# Patient Record
Sex: MALE | Race: WHITE | Employment: FULL TIME | URBAN - NONMETROPOLITAN AREA
[De-identification: names, ages, dates, MRNs, and addresses within clinical notes are randomized per-mention and may not be internally consistent; named-entity substitution may affect disease eponyms.]

---

## 2017-06-13 ENCOUNTER — OFFICE VISIT (OUTPATIENT)
Dept: FAMILY MEDICINE | Facility: OTHER | Age: 37
End: 2017-06-13
Attending: FAMILY MEDICINE
Payer: COMMERCIAL

## 2017-06-13 VITALS
TEMPERATURE: 97.7 F | RESPIRATION RATE: 16 BRPM | SYSTOLIC BLOOD PRESSURE: 118 MMHG | DIASTOLIC BLOOD PRESSURE: 78 MMHG | HEIGHT: 70 IN | HEART RATE: 74 BPM | WEIGHT: 303 LBS | OXYGEN SATURATION: 96 % | BODY MASS INDEX: 43.38 KG/M2

## 2017-06-13 DIAGNOSIS — Z71.89 ACP (ADVANCE CARE PLANNING): ICD-10-CM

## 2017-06-13 DIAGNOSIS — R53.83 OTHER FATIGUE: ICD-10-CM

## 2017-06-13 DIAGNOSIS — K22.70 BARRETT'S ESOPHAGUS WITHOUT DYSPLASIA: ICD-10-CM

## 2017-06-13 DIAGNOSIS — Z00.00 ROUTINE HISTORY AND PHYSICAL EXAMINATION OF ADULT: Primary | ICD-10-CM

## 2017-06-13 DIAGNOSIS — L23.7 CONTACT DERMATITIS DUE TO POISON IVY: ICD-10-CM

## 2017-06-13 DIAGNOSIS — K21.00 GASTROESOPHAGEAL REFLUX DISEASE WITH ESOPHAGITIS: ICD-10-CM

## 2017-06-13 PROCEDURE — 99395 PREV VISIT EST AGE 18-39: CPT | Performed by: FAMILY MEDICINE

## 2017-06-13 RX ORDER — TRIAMCINOLONE ACETONIDE 5 MG/G
CREAM TOPICAL
Qty: 30 G | Refills: 0 | Status: SHIPPED | OUTPATIENT
Start: 2017-06-13 | End: 2018-08-06

## 2017-06-13 ASSESSMENT — PAIN SCALES - GENERAL: PAINLEVEL: NO PAIN (0)

## 2017-06-13 NOTE — MR AVS SNAPSHOT
After Visit Summary   6/13/2017    Tristan Melgar    MRN: [id number]           Patient Information     Date Of Birth          1980        Visit Information        Provider Department      6/13/2017 3:00 PM Stone Cook MD [facility]        Today's Diagnoses     Routine history and physical examination of adult    -  1    ACP (advance care planning)        Reina's esophagus without dysplasia        Gastroesophageal reflux disease with esophagitis        Contact dermatitis due to poison ivy        Other fatigue          Care Instructions    F/u with ongoing concerns.           Follow-ups after your visit        Additional Services     SLEEP EVALUATION & MANAGEMENT REFERRAL - ADULT       Please be aware that coverage of these services is subject to the terms and limitations of your health insurance plan.  Call member services at your health plan with any benefit or coverage questions.      Please bring the following to your appointment:    >>   List of current medications   >>   This referral request   >>   Any documents/labs given to you for this referral    Other:  none                  Your next 10 appointments already scheduled     Jul 13, 2017  4:00 PM CDT   (Arrive by 3:45 PM)   SHORT with Stone Cook MD   [facility] ([facility] )    [address] [facility] [address]   [phone]              Future tests that were ordered for you today     Open Future Orders        Priority Expected Expires Ordered    SLEEP EVALUATION & MANAGEMENT REFERRAL - ADULT Routine  6/13/2018 6/13/2017    CBC with platelets and differential Routine  9/13/2017 6/13/2017    Lipid Profile (Chol, Trig, HDL, LDL calc) Routine  9/13/2017 6/13/2017    Basic metabolic panel Routine  9/13/2017 6/13/2017            Who to contact     If you have questions or need follow up information about today's clinic visit or your schedule please contact [place]  [facility] directly at [phone].  Normal or non-critical lab and imaging results will be communicated to you by MyChart, letter or phone within 4 business days after the clinic has received the results. If you do not hear from us within 7 days, please contact the clinic through [employer] or phone. If you have a critical or abnormal lab result, we will notify you by phone as soon as possible.  Submit refill requests through [employer] or call your pharmacy and they will forward the refill request to us. Please allow 3 business days for your refill to be completed.          Additional Information About Your Visit        [employer]Vide[employer]ious Information     [email or web address] lets you send messages to your doctor, view your test results, renew your prescriptions, schedule appointments and more. To sign up, go to [email or web address] . Click on \"Log in\" on the left side of the screen, which will take you to the Welcome page. Then click on \"Sign up Now\" on the right side of the page.     You will be asked to enter the access code listed below, as well as some personal information. Please follow the directions to create your username and password.     Your access code is: [email or web address]  Expires: 2017  5:42 PM     Your access code will  in 90 days. If you need help or a new code, please call your [place] clinic or [phone].        Care EveryWhere ID     This is your Care EveryWhere ID. This could be used by other organizations to access your [place] medical records  [id number]        Your Vitals Were     Pulse Temperature Respirations Height Pulse Oximetry BMI (Body Mass Index)    74 97.7  F (36.5  C) (Tympanic) 16 5' 10\" (1.778 m) 96% 43.48 kg/m2       Blood Pressure from Last 3 Encounters:   17 118/78   07/07/15 122/72   06/26/15 106/67    Weight from Last 3 Encounters:   17 (!) 303 lb (137.4 kg)   06/25/15 (!) 310 lb (140.6 kg)   06/23/15 (!) 310 lb (140.6 kg)                 Today's Medication Changes    "       These changes are accurate as of: 6/13/17  5:42 PM.  If you have any questions, ask your nurse or doctor.               Start taking these medicines.        Dose/Directions    triamcinolone 0.5 % cream   Commonly known as:  KENALOG   Used for:  Routine history and physical examination of adult   Started by:  Stone Cook MD        Apply sparingly to affected area three times daily.   Quantity:  30 g   Refills:  0            Where to get your medicines      These medications were sent to [id number] [place] [address]   [address] , [place] [address]     Phone:  [phone]     triamcinolone 0.5 % cream                Primary Care Provider Office Phone # Fax #    Stone Cook [phone]       [facility] [address] [facility] [address]        Thank you!     Thank you for choosing [facility]  for your care. Our goal is always to provide you with excellent care. Hearing back from our patients is one way we can continue to improve our services. Please take a few minutes to complete the written survey that you may receive in the mail after your visit with us. Thank you!             Your Updated Medication List - Protect others around you: Learn how to safely use, store and throw away your medicines at www.disposemymeds.org.          This list is accurate as of: 6/13/17  5:42 PM.  Always use your most recent med list.                   Brand Name Dispense Instructions for use    ALLEGRA ALLERGY PO      Take 180 mg by mouth daily       BENADRYL 25 MG tablet   Generic drug:  diphenhydrAMINE      Take 50 mg by mouth every 6 hours as needed for itching or allergies       EPINEPHrine 0.3 MG/0.3ML injection    EPIPEN 2-KENZIE    1 each    Inject 0.3 mLs (0.3 mg) into the muscle once as needed for anaphylaxis       IMODIUM A-D PO      Take by mouth as needed       omeprazole 40 MG capsule    priLOSEC    90 capsule    Take 1 capsule (40 mg) by  mouth daily Take 30-60 minutes before a meal.       triamcinolone 0.5 % cream    KENALOG    30 g    Apply sparingly to affected area three times daily.

## 2017-06-13 NOTE — PROGRESS NOTES
Subjective:  Tristan Melgar is a 36 year old male who presents for routine cares.    Past Medical History:   Diagnosis Date     Fracture     L1       Past Surgical History:   Procedure Laterality Date     ESOPHAGOSCOPY, GASTROSCOPY, DUODENOSCOPY (EGD), COMBINED N/A 6/26/2015    Procedure: COMBINED ESOPHAGOSCOPY, GASTROSCOPY, DUODENOSCOPY (EGD);  Surgeon: Jasper Lima DO;  Location: [facility]     NO HISTORY OF SURGERY       ORTHOPEDIC SURGERY Left 2011    meniscus, knee       Family History   Problem Relation Age of Onset     Other Cancer Maternal Grandmother      throat cancer     Other Cancer Maternal Grandfather      Other Cancer Paternal Grandmother      pancreatic cancer     Other Cancer Paternal Grandfather      tumor on the spine       Social History   Substance Use Topics     Smoking status: Never Smoker     Smokeless tobacco: Never Used     Alcohol use No       Current Outpatient Prescriptions   Medication     triamcinolone (KENALOG) 0.5 % cream     Fexofenadine HCl (ALLEGRA ALLERGY PO)     omeprazole (PRILOSEC) 40 MG capsule     Loperamide HCl (IMODIUM A-D PO)     diphenhydrAMINE (BENADRYL) 25 MG tablet     EPINEPHrine (EPIPEN 2-KENZIE) 0.3 MG/0.3ML injection     No current facility-administered medications for this visit.        No Known Allergies    Review of Systems:  Gen: negative for fever, chills, change in weight  Derm: negative for worrisome rashes, moles or lesions  Eyes: negative for vision changes or irritation  ENT: negative for ear, mouth and throat problems  Resp: negative for significant cough or SOB  Breast: negative for masses, tenderness or discharge  CV: negative for chest pain, palpitations or peripheral edema  GI: negative for nausea, abdominal pain, heartburn, or change in bowel habits  : negative for frequency, dysuria, or hematuria  Musculoskeletal: negative for significant arthralgias or myalgia  Neuro: negative for weakness, dizziness or paresthesias  Endo: negative for  temperature intolerance, skin/hair changes  Heme: negative for bleeding problems  Psych: negative for changes in mood or affect    Objective:  B/P: 118/78, T: 97.7, P: 74, R: 16    Physical Exam:  Constitutional: healthy, alert and no distress  Head: Normocephalic. No masses, lesions, tenderness or abnormalities  ENT: ENT exam normal, no neck nodes or sinus tenderness  CV: RRR. No murmurs, clicks gallops or rub  Pulm: Lungs clear to auscultation b/l, no rhonchi, rales or wheezes.  GI: Abdomen soft, non-tender. BS normal. No masses, organomegaly  : No abnormalities noted  Musculoskeletal: extremities normal- no gross deformities noted, gait normal and normal muscle tone  Skin: no suspicious lesions or rashes.  Large tag on left lower buttocks.  Contact dermatitis on the right thigh and on the forearms.    Neuroc: Gait normal. Reflexes normal and symmetric. Sensation grossly WNL.  Psych: mentation appears normal and affect normal/bright  Heme: normal ant/post cervical, axillary, supraclavicular and inguinal nodes    Labs pending, see chart for results.    Assessment and Plan:  (Z00.00) Routine history and physical examination of adult  (primary encounter diagnosis)  Comment: doing well   Plan: triamcinolone (KENALOG) 0.5 % cream, CBC with         platelets and differential, Lipid Profile         (Chol, Trig, HDL, LDL calc), Basic metabolic         panel        Update labs and follow.       (K22.70) Reina's esophagus without dysplasia  Comment: discussed.   Plan: due for scope next year.     (K21.0) Gastroesophageal reflux disease with esophagitis  Comment: stable.   Plan: continue prilosec.     (L23.7) Contact dermatitis due to poison ivy  Comment: reviewed.   Plan: tcm cream.     (R53.83) Other fatigue  Comment: discussed.   Plan: CBC with platelets and differential, Basic         metabolic panel, SLEEP EVALUATION & MANAGEMENT         REFERRAL - ADULT        Loud snoring.  Getting sleep study.      Visit to set  up skin tag removal.    .      Stone Cook

## 2017-06-13 NOTE — NURSING NOTE
"Chief Complaint   Patient presents with     Physical     Pt is in for a physical today.     Gastrophageal Reflux     Pt is in for a FU on GERD.     Derm Problem     Pt has a rash on his right forearm, left forearm and abd for one week. Pt has not been in the woods.       Initial /78 (BP Location: Right arm, Patient Position: Chair, Cuff Size: Adult Large)  Pulse 74  Temp 97.7  F (36.5  C) (Tympanic)  Resp 16  Ht 5' 10\" (1.778 m)  Wt (!) 303 lb (137.4 kg)  SpO2 96%  BMI 43.48 kg/m2 Estimated body mass index is 43.48 kg/(m^2) as calculated from the following:    Height as of this encounter: 5' 10\" (1.778 m).    Weight as of this encounter: 303 lb (137.4 kg).  Medication Reconciliation: complete   Parris Maier    "

## 2017-06-14 ENCOUNTER — TRANSFERRED RECORDS (OUTPATIENT)
Dept: HEALTH INFORMATION MANAGEMENT | Facility: HOSPITAL | Age: 37
End: 2017-06-14

## 2017-06-14 ASSESSMENT — PATIENT HEALTH QUESTIONNAIRE - PHQ9: SUM OF ALL RESPONSES TO PHQ QUESTIONS 1-9: 2

## 2017-06-16 DIAGNOSIS — R73.9 HYPERGLYCEMIA: Primary | ICD-10-CM

## 2017-06-16 DIAGNOSIS — Z00.00 ROUTINE HISTORY AND PHYSICAL EXAMINATION OF ADULT: ICD-10-CM

## 2017-06-16 DIAGNOSIS — R53.83 OTHER FATIGUE: ICD-10-CM

## 2017-06-16 LAB
ANION GAP SERPL CALCULATED.3IONS-SCNC: 10 MMOL/L (ref 3–14)
BASOPHILS # BLD AUTO: 0 10E9/L (ref 0–0.2)
BASOPHILS NFR BLD AUTO: 0.4 %
BUN SERPL-MCNC: 12 MG/DL (ref 7–30)
CALCIUM SERPL-MCNC: 8.4 MG/DL (ref 8.5–10.1)
CHLORIDE SERPL-SCNC: 104 MMOL/L (ref 94–109)
CHOLEST SERPL-MCNC: 184 MG/DL
CO2 SERPL-SCNC: 25 MMOL/L (ref 20–32)
CREAT SERPL-MCNC: 0.9 MG/DL (ref 0.66–1.25)
DIFFERENTIAL METHOD BLD: NORMAL
EOSINOPHIL # BLD AUTO: 0.2 10E9/L (ref 0–0.7)
EOSINOPHIL NFR BLD AUTO: 3.3 %
ERYTHROCYTE [DISTWIDTH] IN BLOOD BY AUTOMATED COUNT: 13.3 % (ref 10–15)
GFR SERPL CREATININE-BSD FRML MDRD: ABNORMAL ML/MIN/1.7M2
GLUCOSE SERPL-MCNC: 168 MG/DL (ref 70–99)
HCT VFR BLD AUTO: 43.2 % (ref 40–53)
HDLC SERPL-MCNC: 48 MG/DL
HGB BLD-MCNC: 14.7 G/DL (ref 13.3–17.7)
LDLC SERPL CALC-MCNC: 97 MG/DL
LYMPHOCYTES # BLD AUTO: 1.3 10E9/L (ref 0.8–5.3)
LYMPHOCYTES NFR BLD AUTO: 19.2 %
MCH RBC QN AUTO: 31.3 PG (ref 26.5–33)
MCHC RBC AUTO-ENTMCNC: 34 G/DL (ref 31.5–36.5)
MCV RBC AUTO: 92 FL (ref 78–100)
MONOCYTES # BLD AUTO: 0.4 10E9/L (ref 0–1.3)
MONOCYTES NFR BLD AUTO: 5.2 %
NEUTROPHILS # BLD AUTO: 4.9 10E9/L (ref 1.6–8.3)
NEUTROPHILS NFR BLD AUTO: 71.9 %
NONHDLC SERPL-MCNC: 136 MG/DL
PLATELET # BLD AUTO: 243 10E9/L (ref 150–450)
POTASSIUM SERPL-SCNC: 3.8 MMOL/L (ref 3.4–5.3)
RBC # BLD AUTO: 4.7 10E12/L (ref 4.4–5.9)
SODIUM SERPL-SCNC: 139 MMOL/L (ref 133–144)
TRIGL SERPL-MCNC: 195 MG/DL
WBC # BLD AUTO: 6.9 10E9/L (ref 4–11)

## 2017-06-16 PROCEDURE — 85025 COMPLETE CBC W/AUTO DIFF WBC: CPT | Performed by: FAMILY MEDICINE

## 2017-06-16 PROCEDURE — 36415 COLL VENOUS BLD VENIPUNCTURE: CPT | Performed by: FAMILY MEDICINE

## 2017-06-16 PROCEDURE — 80061 LIPID PANEL: CPT | Performed by: FAMILY MEDICINE

## 2017-06-16 PROCEDURE — 80048 BASIC METABOLIC PNL TOTAL CA: CPT | Performed by: FAMILY MEDICINE

## 2017-06-20 ENCOUNTER — OFFICE VISIT (OUTPATIENT)
Dept: SLEEP MEDICINE | Facility: HOSPITAL | Age: 37
End: 2017-06-20
Attending: INTERNAL MEDICINE
Payer: COMMERCIAL

## 2017-06-20 ENCOUNTER — OFFICE VISIT (OUTPATIENT)
Dept: SLEEP MEDICINE | Facility: HOSPITAL | Age: 37
End: 2017-06-20
Attending: FAMILY MEDICINE
Payer: COMMERCIAL

## 2017-06-20 VITALS
OXYGEN SATURATION: 96 % | BODY MASS INDEX: 42 KG/M2 | SYSTOLIC BLOOD PRESSURE: 120 MMHG | WEIGHT: 300 LBS | RESPIRATION RATE: 14 BRPM | HEART RATE: 70 BPM | HEIGHT: 71 IN | DIASTOLIC BLOOD PRESSURE: 64 MMHG

## 2017-06-20 DIAGNOSIS — G47.33 OBSTRUCTIVE SLEEP APNEA SYNDROME: Primary | ICD-10-CM

## 2017-06-20 DIAGNOSIS — R53.83 OTHER FATIGUE: ICD-10-CM

## 2017-06-20 PROCEDURE — G0399 HOME SLEEP TEST/TYPE 3 PORTA: HCPCS

## 2017-06-20 PROCEDURE — G0399 HOME SLEEP TEST/TYPE 3 PORTA: HCPCS | Mod: 26 | Performed by: INTERNAL MEDICINE

## 2017-06-20 PROCEDURE — 99212 OFFICE O/P EST SF 10 MIN: CPT

## 2017-06-20 PROCEDURE — 99241 ZZC OFFICE CONSULTATION,LEVEL I: CPT | Performed by: INTERNAL MEDICINE

## 2017-06-20 NOTE — MR AVS SNAPSHOT
"              After Visit Summary   6/20/2017    Tristan Melgar    MRN: [id number]           Patient Information     Date Of Birth          1980        Visit Information        Provider Department      6/20/2017 2:30 PM [facility]        Today's Diagnoses     Obstructive sleep apnea syndrome    -  1       Follow-ups after your visit        Your next 10 appointments already scheduled     Jul 13, 2017  4:00 PM CDT   (Arrive by 3:45 PM)   SHORT with Stone Cook MD   [facility] )    [address]  [facility] [address]   [phone]              Future tests that were ordered for you today     Open Future Orders        Priority Expected Expires Ordered    HST-Home Sleep Apnea Test Routine  12/20/2017 6/20/2017            Who to contact     If you have questions or need follow up information about today's clinic visit or your schedule please contact [facility] directly at [phone].  Normal or non-critical lab and imaging results will be communicated to you by MyChart, letter or phone within 4 business days after the clinic has received the results. If you do not hear from us within 7 days, please contact the clinic through [employer] or phone. If you have a critical or abnormal lab result, we will notify you by phone as soon as possible.  Submit refill requests through [employer] or call your pharmacy and they will forward the refill request to us. Please allow 3 business days for your refill to be completed.          Additional Information About Your Visit        [employer] Information     [employer] lets you send messages to your doctor, view your test results, renew your prescriptions, schedule appointments and more. To sign up, go to [email or web address] . Click on \"Log in\" on the left side of the screen, which will take you to the Welcome page. Then click on \"Sign up Now\" on the right side of the page.     You will be asked to enter the " access code listed below, as well as some personal information. Please follow the directions to create your username and password.     Your access code is: [email or web address]  Expires: 2017  5:42 PM     Your access code will  in 90 days. If you need help or a new code, please call your [facility] or [phone].        Care EveryWhere ID     This is your Care EveryWhere ID. This could be used by other organizations to access your [place] medical records  [id number]         Blood Pressure from Last 3 Encounters:   17 120/64   17 118/78   07/07/15 122/72    Weight from Last 3 Encounters:   17 300 lb (136.1 kg)   17 (!) 303 lb (137.4 kg)   06/25/15 (!) 310 lb (140.6 kg)              We Performed the Following     Comprehensive DME        Primary Care Provider Office Phone # Fax #    Stone Cook [phone]       [address] [address]        Equal Access to Services     [facility]: Hadii aad ku hadasho Soomaali, waaxda luqadaha, qaybta kaalmada adelilian, britton enriquez . So [facility] [phone].    ATENCIÓN: Si habla español, tiene a correa disposición servicios gratuitos de asistencia lingüística. [facility] [phone].    We comply with applicable federal civil rights laws and [place] laws. We do not discriminate on the basis of race, color, national origin, age, disability sex, sexual orientation or gender identity.            Thank you!     Thank you for choosing [facility]  for your care. Our goal is always to provide you with excellent care. Hearing back from our patients is one way we can continue to improve our services. Please take a few minutes to complete the written survey that you may receive in the mail after your visit with us. Thank you!             Your Updated Medication List - Protect others around you: Learn how to safely use, store and throw away your medicines at  www.disposemymeds.org.          This list is accurate as of: 6/20/17 11:59 PM.  Always use your most recent med list.                   Brand Name Dispense Instructions for use Diagnosis    ALLEGRA ALLERGY PO      Take 180 mg by mouth daily        BENADRYL 25 MG tablet   Generic drug:  diphenhydrAMINE      Take 50 mg by mouth every 6 hours as needed for itching or allergies        EPINEPHrine 0.3 MG/0.3ML injection    EPIPEN 2-KENZIE    1 each    Inject 0.3 mLs (0.3 mg) into the muscle once as needed for anaphylaxis    Allergic reaction, initial encounter       IMODIUM A-D PO      Take by mouth as needed        omeprazole 40 MG capsule    priLOSEC    90 capsule    Take 1 capsule (40 mg) by mouth daily Take 30-60 minutes before a meal.    Esophageal reflux       triamcinolone 0.5 % cream    KENALOG    30 g    Apply sparingly to affected area three times daily.    Routine history and physical examination of adult

## 2017-06-20 NOTE — MR AVS SNAPSHOT
"              After Visit Summary   6/20/2017    Tristan Melgar    MRN: [id number]           Patient Information     Date Of Birth          1980        Visit Information        Provider Department      6/20/2017 11:30 AM Jorge Iraheta MD [facility]        Today's Diagnoses     Other fatigue           Follow-ups after your visit        Your next 10 appointments already scheduled     Jun 20, 2017  2:30 PM CDT   HST  with [facility]   [facility] ([facility] )    [address] [place] [id number]   [phone]            Jul 13, 2017  4:00 PM CDT   (Arrive by 3:45 PM)   SHORT with Stone Cook MD   [facility] ([facility] )    [address]  [facility] [address]   [phone]              Future tests that were ordered for you today     Open Future Orders        Priority Expected Expires Ordered    HST-Home Sleep Apnea Test Routine  12/20/2017 6/20/2017            Who to contact     If you have questions or need follow up information about today's clinic visit or your schedule please contact [facility] directly at [phone].  Normal or non-critical lab and imaging results will be communicated to you by MyChart, letter or phone within 4 business days after the clinic has received the results. If you do not hear from us within 7 days, please contact the clinic through Synchronized[employer] or phone. If you have a critical or abnormal lab result, we will notify you by phone as soon as possible.  Submit refill requests through Text[employer] or call your pharmacy and they will forward the refill request to us. Please allow 3 business days for your refill to be completed.          Additional Information About Your Visit        MyChart Information     Text[employer] lets you send messages to your doctor, view your test results, renew your prescriptions, schedule appointments and more. To sign up, go to [email or web address] . Click on \"Log in\" on the left side " "of the screen, which will take you to the Welcome page. Then click on \"Sign up Now\" on the right side of the page.     You will be asked to enter the access code listed below, as well as some personal information. Please follow the directions to create your username and password.     Your access code is: [email or web address]  Expires: 2017  5:42 PM     Your access code will  in 90 days. If you need help or a new code, please call your [facility] or [phone].        Care EveryWhere ID     This is your Care EveryWhere ID. This could be used by other organizations to access your [place] medical records  [id number]        Your Vitals Were     Pulse Respirations Height Pulse Oximetry BMI (Body Mass Index)       70 14 5' 11\" (1.803 m) 96% 41.84 kg/m2        Blood Pressure from Last 3 Encounters:   17 120/64   17 118/78   07/07/15 122/72    Weight from Last 3 Encounters:   17 300 lb (136.1 kg)   17 (!) 303 lb (137.4 kg)   06/25/15 (!) 310 lb (140.6 kg)              We Performed the Following     SLEEP EVALUATION & MANAGEMENT REFERRAL - ADULT        Primary Care Provider Office Phone # Fax #    Stone Cook [phone]       [address] [address]        Thank you!     Thank you for choosing [facility]  for your care. Our goal is always to provide you with excellent care. Hearing back from our patients is one way we can continue to improve our services. Please take a few minutes to complete the written survey that you may receive in the mail after your visit with us. Thank you!             Your Updated Medication List - Protect others around you: Learn how to safely use, store and throw away your medicines at www.disposemymeds.org.          This list is accurate as of: 17 11:51 AM.  Always use your most recent med list.                   Brand Name Dispense Instructions for use    ALLEGRA ALLERGY PO      Take 180 mg by " mouth daily       BENADRYL 25 MG tablet   Generic drug:  diphenhydrAMINE      Take 50 mg by mouth every 6 hours as needed for itching or allergies       EPINEPHrine 0.3 MG/0.3ML injection    EPIPEN 2-KENZIE    1 each    Inject 0.3 mLs (0.3 mg) into the muscle once as needed for anaphylaxis       IMODIUM A-D PO      Take by mouth as needed       omeprazole 40 MG capsule    priLOSEC    90 capsule    Take 1 capsule (40 mg) by mouth daily Take 30-60 minutes before a meal.       triamcinolone 0.5 % cream    KENALOG    30 g    Apply sparingly to affected area three times daily.

## 2017-06-20 NOTE — NURSING NOTE
Patient picked up the home sleep test I reviewed the instructions with him and he had good understanding I also gave him written instructions and a number to call the sleep tech if he has questions.

## 2017-06-20 NOTE — LETTER
Tristan Melgar  [address]  [facility] [place] [address]    June 21, 2017         Dear Tristan      I recently read your sleep study, you do have sleep apnea stopping or slowing your breathing  about 35 times per hour.     This is likely disturbing your sleep and making you feel tired during the day. I think we should try you on an automatically adjusting CPAP mask , hopefully it will make you feel more rested during the day and may help protect you from future health problems.     I will ask our sleep lab staff to set up a trial of the mask, if you have any problems or concerns please give us a call.     I'll plan to see you in follow up in the future and I hope it helps.                                                                                       Sincerely,          Jorge Iraheta MD, D,[facility]           [address] [place] [address] [phone]

## 2017-06-20 NOTE — PROGRESS NOTES
"37 y/o referred by Dr Cook with [facility]. Pt snores and has regular apnea, gasps himself awake in the chair. Sleep hours are 1030 to 630, no insomnia. Denies AM HAs. Weight stable , he's 5 11 300. He is terribly sleepy during the day, falls asleep quickly in quiet situations.     PMH o/w unremarkable    SH single, works 4 jobs incl [employer], very nice young man    PE   /64  Pulse 70  Resp 14  Ht 5' 11\" (1.803 m)  Wt 300 lb (136.1 kg)  SpO2 96%  BMI 41.84 kg/m2             Heent  Very full ant neck, narrowed airway, moderate tonsils      Current Outpatient Prescriptions:      triamcinolone (KENALOG) 0.5 % cream, Apply sparingly to affected area three times daily., Disp: 30 g, Rfl: 0     Fexofenadine HCl (ALLEGRA ALLERGY PO), Take 180 mg by mouth daily, Disp: , Rfl:      omeprazole (PRILOSEC) 40 MG capsule, Take 1 capsule (40 mg) by mouth daily Take 30-60 minutes before a meal., Disp: 90 capsule, Rfl: 1     Loperamide HCl (IMODIUM A-D PO), Take by mouth as needed, Disp: , Rfl:      diphenhydrAMINE (BENADRYL) 25 MG tablet, Take 50 mg by mouth every 6 hours as needed for itching or allergies, Disp: , Rfl:      EPINEPHrine (EPIPEN 2-KENZIE) 0.3 MG/0.3ML injection, Inject 0.3 mLs (0.3 mg) into the muscle once as needed for anaphylaxis, Disp: 1 each, Rfl: 1     A/ Classic [facility]   Home study.  "

## 2017-06-21 NOTE — NURSING NOTE
Patient brought the Home Sleep test back and I reviewed the data. He went to bed around 11 and was up at 6. He did have snoring and respiratory events with an index of 33.5 and low SPO2 of 69%. He did do best on his right side. Patient tolerated test well and will wait to hear from

## 2017-06-21 NOTE — PROGRESS NOTES
PHYSICIAN INTERPRETATION   HOME SLEEP STUDY   Patient: Tristan Melgar  MRN: [id number]  YOB: 1980  Study Date: 6/20/17   Referring Physician: Stone Cook  Ordering Physician: Jorge Iraheta MD   Indications for Home Study: The patient snores loudly and has apneas as well as excessive daytime somnolence.  Data: A full night home sleep study was performed recording the standard physiologic parameters including body position, movement, nasal pressure, thermal oral airflow, chest and abdominal movements with respiratory inductance plethysmography, and oxygen saturation by pulse oximetry. Pulse rate was estimated by oximetry recording. This study was considered adequate based on > 4 hours of quality oximetry and respiratory recording.     Respiration:   Sleep Associated Hypoxemia  Baseline oxygen saturation was 92% and time spent below 89% saturation was 8 minutes. The lowest oxygen saturation was 69%.   Respiratory events - During the diagnostic portion of the study, the home study revealed a presence of 82 obstructive, 1 mixed and central apneas. There were 145 hypopneas resulting in a combined apnea/hypopnea index [AHI] of 34 events per hour.   EKG monitored and no arrhythmias were seen.    Assessment: RODRICK    Recommendations: autoPap.      Jorge Iraheta, June 21, 2017   Diplomate, American Board of Internal Medicine, Sleep Medicine

## 2017-07-13 ENCOUNTER — OFFICE VISIT (OUTPATIENT)
Dept: FAMILY MEDICINE | Facility: OTHER | Age: 37
End: 2017-07-13
Attending: FAMILY MEDICINE
Payer: COMMERCIAL

## 2017-07-13 VITALS
DIASTOLIC BLOOD PRESSURE: 80 MMHG | TEMPERATURE: 97.3 F | BODY MASS INDEX: 40.63 KG/M2 | HEIGHT: 72 IN | SYSTOLIC BLOOD PRESSURE: 118 MMHG | WEIGHT: 300 LBS | OXYGEN SATURATION: 96 % | HEART RATE: 74 BPM

## 2017-07-13 DIAGNOSIS — L91.8 SKIN TAG: Primary | ICD-10-CM

## 2017-07-13 PROCEDURE — 11200 RMVL SKIN TAGS UP TO&INC 15: CPT | Performed by: FAMILY MEDICINE

## 2017-07-13 PROCEDURE — 88305 TISSUE EXAM BY PATHOLOGIST: CPT | Mod: TC | Performed by: FAMILY MEDICINE

## 2017-07-13 PROCEDURE — 11100 HC BIOPSY SKIN/SUBQ/MUC MEM, SINGLE LESION: CPT | Mod: 59 | Performed by: FAMILY MEDICINE

## 2017-07-13 ASSESSMENT — PAIN SCALES - GENERAL: PAINLEVEL: NO PAIN (0)

## 2017-07-13 ASSESSMENT — ANXIETY QUESTIONNAIRES
GAD7 TOTAL SCORE: 0
5. BEING SO RESTLESS THAT IT IS HARD TO SIT STILL: NOT AT ALL
1. FEELING NERVOUS, ANXIOUS, OR ON EDGE: NOT AT ALL
2. NOT BEING ABLE TO STOP OR CONTROL WORRYING: NOT AT ALL
3. WORRYING TOO MUCH ABOUT DIFFERENT THINGS: NOT AT ALL
7. FEELING AFRAID AS IF SOMETHING AWFUL MIGHT HAPPEN: NOT AT ALL
6. BECOMING EASILY ANNOYED OR IRRITABLE: NOT AT ALL

## 2017-07-13 ASSESSMENT — PATIENT HEALTH QUESTIONNAIRE - PHQ9: 5. POOR APPETITE OR OVEREATING: NOT AT ALL

## 2017-07-13 NOTE — NURSING NOTE
"Chief Complaint   Patient presents with     Procedure     pt here to get skin tags removed- states he has one on left leg, some in armpit (s) and neck        Initial /80 (BP Location: Right arm, Patient Position: Supine, Cuff Size: Adult Large)  Pulse 74  Temp 97.3  F (36.3  C) (Tympanic)  Ht 5' 11.5\" (1.816 m)  Wt 300 lb (136.1 kg)  SpO2 96%  BMI 41.26 kg/m2 Estimated body mass index is 41.26 kg/(m^2) as calculated from the following:    Height as of this encounter: 5' 11.5\" (1.816 m).    Weight as of this encounter: 300 lb (136.1 kg).  Medication Reconciliation: complete   Luz Maria Liriano LPN      "

## 2017-07-13 NOTE — MR AVS SNAPSHOT
"              After Visit Summary   2017    Tristan Melgar    MRN: [id number]           Patient Information     Date Of Birth          1980        Visit Information        Provider Department      2017 4:00 PM Stone Cook MD [facility]        Today's Diagnoses     Skin tag    -  1      Care Instructions    F/u with ongoing concerns.           Follow-ups after your visit        Who to contact     If you have questions or need follow up information about today's clinic visit or your schedule please contact [facility] directly at [phone].  Normal or non-critical lab and imaging results will be communicated to you by [employer], letter or phone within 4 business days after the clinic has received the results. If you do not hear from us within 7 days, please contact the clinic through [employer] or phone. If you have a critical or abnormal lab result, we will notify you by phone as soon as possible.  Submit refill requests through [employer] or call your pharmacy and they will forward the refill request to us. Please allow 3 business days for your refill to be completed.          Additional Information About Your Visit        MyChart Information     [employer] lets you send messages to your doctor, view your test results, renew your prescriptions, schedule appointments and more. To sign up, go to [email or web address] . Click on \"Log in\" on the left side of the screen, which will take you to the Welcome page. Then click on \"Sign up Now\" on the right side of the page.     You will be asked to enter the access code listed below, as well as some personal information. Please follow the directions to create your username and password.     Your access code is: [email or web address]  Expires: 2017  5:42 PM     Your access code will  in 90 days. If you need help or a new code, please call your [facility] or [phone].        Care EveryWhere ID     This is your Care " "EveryWhere ID. This could be used by other organizations to access your [facility] medical records  [id number]        Your Vitals Were     Pulse Temperature Height Pulse Oximetry BMI (Body Mass Index)       74 97.3  F (36.3  C) (Tympanic) 5' 11.5\" (1.816 m) 96% 41.26 kg/m2        Blood Pressure from Last 3 Encounters:   07/13/17 118/80   06/20/17 120/64   06/13/17 118/78    Weight from Last 3 Encounters:   07/13/17 300 lb (136.1 kg)   06/20/17 300 lb (136.1 kg)   06/13/17 (!) 303 lb (137.4 kg)              We Performed the Following     BIOPSY SKIN/SUBQ/MUC MEM, SINGLE LESION     REMOVAL OF SKIN TAGS, FIRST 15     Surgical pathology exam        Primary Care Provider Office Phone # Fax #    Stone Cook [phone]       [place] [address] [address] [facility] [address]  [facility] [address]        Equal Access to Services     IBAN DEVRIES : Hadii aad ku hadasho Soomaali, waaxda luqadaha, qaybta kaalmada adeegyada, waxay idiin hayaan fordeg khararuel la'lisa . So [facility] [phone].    ATENCIÓN: Si habla español, tiene a correa disposición servicios gratuitos de asistencia lingüística. Ll[facility] [phone].    We comply with applicable federal civil rights laws and [place] laws. We do not discriminate on the basis of race, color, national origin, age, disability sex, sexual orientation or gender identity.            Thank you!     Thank you for choosing [facility]  for your care. Our goal is always to provide you with excellent care. Hearing back from our patients is one way we can continue to improve our services. Please take a few minutes to complete the written survey that you may receive in the mail after your visit with us. Thank you!             Your Updated Medication List - Protect others around you: Learn how to safely use, store and throw away your medicines at www.disposemymeds.org.          This list is accurate as of: 7/13/17  4:50 PM.  Always use your most recent med list.             "       Brand Name Dispense Instructions for use Diagnosis    ALLEGRA ALLERGY PO      Take 180 mg by mouth daily        BENADRYL 25 MG tablet   Generic drug:  diphenhydrAMINE      Take 50 mg by mouth every 6 hours as needed for itching or allergies        EPINEPHrine 0.3 MG/0.3ML injection    EPIPEN 2-KENZIE    1 each    Inject 0.3 mLs (0.3 mg) into the muscle once as needed for anaphylaxis    Allergic reaction, initial encounter       IMODIUM A-D PO      Take by mouth as needed        MULTIVITAMIN ADULT PO      Take 1 tablet by mouth daily        omeprazole 40 MG capsule    priLOSEC    90 capsule    Take 1 capsule (40 mg) by mouth daily Take 30-60 minutes before a meal.    Esophageal reflux       triamcinolone 0.5 % cream    KENALOG    30 g    Apply sparingly to affected area three times daily.    Routine history and physical examination of adult

## 2017-07-13 NOTE — PROGRESS NOTES
Tristan Melgar    July 13, 2017    Chief Complaint   Patient presents with     Procedure     pt here to get skin tags removed- states he has one on left leg, some in armpit (s) and neck        SUBJECTIVE:  Patient here for procedure.  Tags on the neck getting caught on collar, tags in the axilla getting caught on shirt, and growing tag on the left lower buttocks.  We just did the procedure today.     Past Medical History:   Diagnosis Date     Fracture     L1       Past Surgical History:   Procedure Laterality Date     ESOPHAGOSCOPY, GASTROSCOPY, DUODENOSCOPY (EGD), COMBINED N/A 6/26/2015    Procedure: COMBINED ESOPHAGOSCOPY, GASTROSCOPY, DUODENOSCOPY (EGD);  Surgeon: Jasper Lima DO;  Location: [facility] OR     NO HISTORY OF SURGERY       ORTHOPEDIC SURGERY Left 2011    meniscus, knee       Current Outpatient Prescriptions   Medication Sig Dispense Refill     Multiple Vitamins-Minerals (MULTIVITAMIN ADULT PO) Take 1 tablet by mouth daily       Fexofenadine HCl (ALLEGRA ALLERGY PO) Take 180 mg by mouth daily       omeprazole (PRILOSEC) 40 MG capsule Take 1 capsule (40 mg) by mouth daily Take 30-60 minutes before a meal. 90 capsule 1     triamcinolone (KENALOG) 0.5 % cream Apply sparingly to affected area three times daily. (Patient not taking: Reported on 7/13/2017) 30 g 0     Loperamide HCl (IMODIUM A-D PO) Take by mouth as needed       diphenhydrAMINE (BENADRYL) 25 MG tablet Take 50 mg by mouth every 6 hours as needed for itching or allergies       EPINEPHrine (EPIPEN 2-KENZIE) 0.3 MG/0.3ML injection Inject 0.3 mLs (0.3 mg) into the muscle once as needed for anaphylaxis (Patient not taking: Reported on 7/13/2017) 1 each 1       No Known Allergies    Family History   Problem Relation Age of Onset     Other Cancer Maternal Grandmother      throat cancer     Other Cancer Maternal Grandfather      Other Cancer Paternal Grandmother      pancreatic cancer     Other Cancer Paternal Grandfather      tumor on the spine        Social History     Social History     Marital status: Single     Spouse name: N/A     Number of children: N/A     Years of education: N/A     Occupational History     Not on file.     Social History Main Topics     Smoking status: Never Smoker     Smokeless tobacco: Never Used     Alcohol use Yes      Comment: socially     Drug use: No     Sexual activity: Not on file     Other Topics Concern     Parent/Sibling W/ Cabg, Mi Or Angioplasty Before 65f 55m? No     Social History Narrative       Procedure:  Skin tags grasped with a forceps and cut with scissors.  14 total between the locations.  The one on the left hamstring is very large and sent for path, but I believe it is still a regular tag.  Band aids applied.  Alcohol for skin prep prior to removal.     ASSESSMENT and PLAN:  (L91.8) Skin tag  (primary encounter diagnosis)  Comment: removed as above.   Plan: REMOVAL OF SKIN TAGS, FIRST 15, BIOPSY         SKIN/SUBQ/MUC MEM, SINGLE LESION, Surgical         pathology exam        F/u with ongoing concerns.  He will report problems and I will get back to him on the path.

## 2017-07-14 ASSESSMENT — ANXIETY QUESTIONNAIRES: GAD7 TOTAL SCORE: 0

## 2017-07-14 ASSESSMENT — PATIENT HEALTH QUESTIONNAIRE - PHQ9: SUM OF ALL RESPONSES TO PHQ QUESTIONS 1-9: 0

## 2017-07-18 LAB — COPATH REPORT: NORMAL

## 2017-10-13 DIAGNOSIS — R73.9 HYPERGLYCEMIA: ICD-10-CM

## 2017-10-13 LAB
EST. AVERAGE GLUCOSE BLD GHB EST-MCNC: 120 MG/DL
GLUCOSE SERPL-MCNC: 95 MG/DL (ref 70–99)
HBA1C MFR BLD: 5.8 % (ref 4.3–6)

## 2017-10-13 PROCEDURE — 36415 COLL VENOUS BLD VENIPUNCTURE: CPT | Performed by: PHYSICIAN ASSISTANT

## 2017-10-13 PROCEDURE — 83036 HEMOGLOBIN GLYCOSYLATED A1C: CPT | Performed by: PHYSICIAN ASSISTANT

## 2017-10-13 PROCEDURE — 82947 ASSAY GLUCOSE BLOOD QUANT: CPT | Performed by: PHYSICIAN ASSISTANT

## 2018-08-04 ENCOUNTER — APPOINTMENT (OUTPATIENT)
Dept: GENERAL RADIOLOGY | Facility: HOSPITAL | Age: 38
End: 2018-08-04
Attending: FAMILY MEDICINE
Payer: COMMERCIAL

## 2018-08-04 ENCOUNTER — HOSPITAL ENCOUNTER (EMERGENCY)
Facility: HOSPITAL | Age: 38
Discharge: HOME | End: 2018-08-04
Attending: FAMILY MEDICINE | Admitting: FAMILY MEDICINE
Payer: COMMERCIAL

## 2018-08-04 VITALS
TEMPERATURE: 98.6 F | HEART RATE: 95 BPM | RESPIRATION RATE: 18 BRPM | OXYGEN SATURATION: 96 % | SYSTOLIC BLOOD PRESSURE: 142 MMHG | DIASTOLIC BLOOD PRESSURE: 95 MMHG

## 2018-08-04 DIAGNOSIS — S62.202A CLOSED NONDISPLACED FRACTURE OF FIRST METACARPAL BONE OF LEFT HAND, UNSPECIFIED PORTION OF METACARPAL, INITIAL ENCOUNTER: ICD-10-CM

## 2018-08-04 DIAGNOSIS — S67.22XA CRUSH INJURY OF HAND, LEFT, INITIAL ENCOUNTER: ICD-10-CM

## 2018-08-04 LAB
ANION GAP SERPL CALCULATED.3IONS-SCNC: 9 MMOL/L (ref 3–14)
BASOPHILS # BLD AUTO: 0 10E9/L (ref 0–0.2)
BASOPHILS NFR BLD AUTO: 0.4 %
BUN SERPL-MCNC: 16 MG/DL (ref 7–30)
CALCIUM SERPL-MCNC: 8.3 MG/DL (ref 8.5–10.1)
CHLORIDE SERPL-SCNC: 109 MMOL/L (ref 94–109)
CO2 SERPL-SCNC: 24 MMOL/L (ref 20–32)
CREAT SERPL-MCNC: 0.97 MG/DL (ref 0.66–1.25)
DIFFERENTIAL METHOD BLD: NORMAL
EOSINOPHIL # BLD AUTO: 0.1 10E9/L (ref 0–0.7)
EOSINOPHIL NFR BLD AUTO: 1.4 %
ERYTHROCYTE [DISTWIDTH] IN BLOOD BY AUTOMATED COUNT: 12.2 % (ref 10–15)
GFR SERPL CREATININE-BSD FRML MDRD: 86 ML/MIN/1.7M2
GLUCOSE SERPL-MCNC: 147 MG/DL (ref 70–99)
HCT VFR BLD AUTO: 42.1 % (ref 40–53)
HGB BLD-MCNC: 15.1 G/DL (ref 13.3–17.7)
IMM GRANULOCYTES # BLD: 0 10E9/L (ref 0–0.4)
IMM GRANULOCYTES NFR BLD: 0.2 %
LYMPHOCYTES # BLD AUTO: 1.7 10E9/L (ref 0.8–5.3)
LYMPHOCYTES NFR BLD AUTO: 20 %
MCH RBC QN AUTO: 31.5 PG (ref 26.5–33)
MCHC RBC AUTO-ENTMCNC: 35.9 G/DL (ref 31.5–36.5)
MCV RBC AUTO: 88 FL (ref 78–100)
MONOCYTES # BLD AUTO: 0.5 10E9/L (ref 0–1.3)
MONOCYTES NFR BLD AUTO: 6.4 %
NEUTROPHILS # BLD AUTO: 5.9 10E9/L (ref 1.6–8.3)
NEUTROPHILS NFR BLD AUTO: 71.6 %
NRBC # BLD AUTO: 0 10*3/UL
NRBC BLD AUTO-RTO: 0 /100
PLATELET # BLD AUTO: 229 10E9/L (ref 150–450)
POTASSIUM SERPL-SCNC: 3.4 MMOL/L (ref 3.4–5.3)
RBC # BLD AUTO: 4.8 10E12/L (ref 4.4–5.9)
SODIUM SERPL-SCNC: 142 MMOL/L (ref 133–144)
WBC # BLD AUTO: 8.3 10E9/L (ref 4–11)

## 2018-08-04 PROCEDURE — 99284 EMERGENCY DEPT VISIT MOD MDM: CPT | Performed by: FAMILY MEDICINE

## 2018-08-04 PROCEDURE — 73130 X-RAY EXAM OF HAND: CPT | Mod: TC,LT

## 2018-08-04 PROCEDURE — 80048 BASIC METABOLIC PNL TOTAL CA: CPT | Performed by: FAMILY MEDICINE

## 2018-08-04 PROCEDURE — 99284 EMERGENCY DEPT VISIT MOD MDM: CPT

## 2018-08-04 PROCEDURE — 73562 X-RAY EXAM OF KNEE 3: CPT | Mod: TC,LT

## 2018-08-04 PROCEDURE — 73090 X-RAY EXAM OF FOREARM: CPT | Mod: TC,LT

## 2018-08-04 PROCEDURE — 85025 COMPLETE CBC W/AUTO DIFF WBC: CPT | Performed by: FAMILY MEDICINE

## 2018-08-04 PROCEDURE — 36415 COLL VENOUS BLD VENIPUNCTURE: CPT | Performed by: FAMILY MEDICINE

## 2018-08-04 RX ORDER — IBUPROFEN 800 MG/1
800 TABLET, FILM COATED ORAL EVERY 8 HOURS PRN
Qty: 60 TABLET | Refills: 0 | Status: SHIPPED | OUTPATIENT
Start: 2018-08-04 | End: 2018-08-12

## 2018-08-04 ASSESSMENT — ENCOUNTER SYMPTOMS
PSYCHIATRIC NEGATIVE: 1
SHORTNESS OF BREATH: 0
DIZZINESS: 0
WEAKNESS: 0
FATIGUE: 0
DYSURIA: 0
ABDOMINAL PAIN: 0
NECK PAIN: 0
ARTHRALGIAS: 1
VOMITING: 0
NECK STIFFNESS: 0
HEADACHES: 0
NAUSEA: 0
FEVER: 0
HEMATURIA: 0
BACK PAIN: 0
ACTIVITY CHANGE: 1
WHEEZING: 0
DIAPHORESIS: 0

## 2018-08-04 NOTE — ED AVS SNAPSHOT
[facility]    [address] [address]    Phone:  [phone]                                       Tristan Melgar   MRN: [id number]    Department:  [facility]   Date of Visit:  8/4/2018           After Visit Summary Signature Page     I have received my discharge instructions, and my questions have been answered. I have discussed any challenges I see with this plan with the nurse or doctor.    ..........................................................................................................................................  Patient/Patient Representative Signature      ..........................................................................................................................................  Patient Representative Print Name and Relationship to Patient    ..................................................               ................................................  Date                                            Time    ..........................................................................................................................................  Reviewed by Signature/Title    ...................................................              ..............................................  Date                                                            Time

## 2018-08-04 NOTE — ED AVS SNAPSHOT
[facility]    [address] [place] [address]    [place] [address]    Phone:  [phone]                                       Tristan Melgar   MRN: [id number]    Department:  [facility]   Date of Visit:  8/4/2018           Patient Information     Date Of Birth          1980        Your diagnoses for this visit were:     Crush injury of hand, left, initial encounter     Closed nondisplaced fracture of first metacarpal bone of left hand, unspecified portion of metacarpal, initial encounter        You were seen by Kacie Hernandez MD.      Follow-up Information     Follow up with Derrek Sargent MD. Call in 2 days.    Specialty:  Orthopedics    Why:  [place] office [phone], Follow up ED visit    Contact information:    [facility]  [address] [address]  [phone]          Discharge Instructions         Closed Hand Fracture (Adult)  You have a fracture, or broken bone, in your hand. This may be a small crack or chip in the bone. Or it may be a major break with the broken parts pushed out of place. A closed fracture means that the broken bone has not gone through the skin. A hand fracture is treated with a splint or cast. It usually takes 4 to 6 weeks to heal. Severe injuries may require surgery.     Home care    Keep your arm elevated to reduce pain and swelling. When sitting or lying down, elevate your arm above the level of your heart. You can do this by placing your arm on a pillow that rests on your chest or on a pillow at your side. This is most important during the first 48 hours after injury.    Apply an ice pack over the injured area for no more than 15 to 20 minutes. Do this every 1 to 2 hours for the first 24 to 48 hours. Continue with ice packs as needed to ease pain and swelling. To make an ice pack, put ice cubes in a plastic bag that seals at the top. Wrap the bag in a clean, thin towel or cloth. Never put ice or an ice pack directly  on the skin. You can place the ice pack inside the sling and directly over the cast or splint. As the ice melts, be careful that the cast or splint doesn t get wet.    Keep the cast or splint completely dry at all times. Bathe with your cast or splint out of the water, protected with 2 large plastic bags. Place 1 bag outside the other. Tape each bag with duct tape at the top end. If a fiberglass cast or splint gets wet, dry it with a hair dryer on a cool setting.    You may use over-the-counter pain medicine to control pain, unless another pain medicine was prescribed. If you have chronic liver or kidney disease or ever had a stomach ulcer or GI bleeding, talk with your provider beforeusing these medicines.  Follow-up care  Follow up with your healthcare provider within 1 week, or as advised. This is to be sure the bone is healing properly. If you were given a splint, it may be changed to a cast at your follow-up visit.  If X-rays were taken, you will be told of any new findings that may affect your care.  When to seek medical advice  Call your healthcare provider right away if any of these occur:    The plaster cast or splint becomes wet or soft    The fiberglass cast or splint stays wet for more than 24 hours    The cast has a bad smell    The plaster cast or splint becomes loose    There is increased tightness or pain under the cast or splint    The fingers on your injured hand become swollen, cold, blue, numb, or tingly  Date Last Reviewed: 12/3/2015    8441-6240 [employer]. [address] [place] [address]. All rights reserved. This information is not intended as a substitute for professional medical care. Always follow your healthcare professional's instructions.             Review of your medicines      START taking        Dose / Directions Last dose taken    ibuprofen 800 MG tablet   Commonly known as:  ADVIL/MOTRIN   Dose:  800 mg   Quantity:  60 tablet        Take 1 tablet (800 mg)  by mouth every 8 hours as needed for moderate pain   Refills:  0          Our records show that you are taking the medicines listed below. If these are incorrect, please call your family doctor or clinic.        Dose / Directions Last dose taken    ALLEGRA ALLERGY PO   Dose:  180 mg        Take 180 mg by mouth daily   Refills:  0        BENADRYL 25 MG tablet   Dose:  50 mg   Generic drug:  diphenhydrAMINE        Take 50 mg by mouth every 6 hours as needed for itching or allergies   Refills:  0        EPINEPHrine 0.3 MG/0.3ML injection 2-pack   Commonly known as:  EPIPEN 2-KENZIE   Dose:  0.3 mg   Quantity:  1 each        Inject 0.3 mLs (0.3 mg) into the muscle once as needed for anaphylaxis   Refills:  1        IMODIUM A-D PO        Take by mouth as needed   Refills:  0        MULTIVITAMIN ADULT PO   Dose:  1 tablet        Take 1 tablet by mouth daily   Refills:  0        omeprazole 40 MG capsule   Commonly known as:  priLOSEC   Dose:  40 mg   Quantity:  90 capsule        Take 1 capsule (40 mg) by mouth daily Take 30-60 minutes before a meal.   Refills:  1        triamcinolone 0.5 % cream   Commonly known as:  KENALOG   Quantity:  30 g        Apply sparingly to affected area three times daily.   Refills:  0                Prescriptions were sent or printed at these locations (1 Prescription)                   [id number] [address] [address] [address] [facility] [address]   [address] [facility] [address] [address]    Telephone:  [phone]   Fax:  [phone]   Hours:                  E-Prescribed (1 of 1)         ibuprofen (ADVIL/MOTRIN) 800 MG tablet                Procedures and tests performed during your visit     Basic metabolic panel    CBC with platelets differential    Radius/Ulna XR,  PA &LAT, left    XR Hand Left G/E 3 Views    XR Knee Left 3 Views      Orders Needing Specimen Collection     None      Pending Results     Date and Time Order Name Status Description    8/4/2018 1953 XR Knee Left 3 Views In process     8/4/2018  "1936 XR Hand Left G/E 3 Views In process     2018 1936 Radius/Ulna XR,  PA &LAT, left In process             Pending Culture Results     No orders found from 2018 to 2018.            Thank you for choosing [facility]       Thank you for choosing [facility] for your care. Our goal is always to provide you with excellent care. Hearing back from our patients is one way we can continue to improve our services. Please take a few minutes to complete the written survey that you may receive in the mail after you visit with us. Thank you!        [employer] Information     [employer] lets you send messages to your doctor, view your test results, renew your prescriptions, schedule appointments and more. To sign up, go to [email or web address] . Click on \"Log in\" on the left side of the screen, which will take you to the Welcome page. Then click on \"Sign up Now\" on the right side of the page.     You will be asked to enter the access code listed below, as well as some personal information. Please follow the directions to create your username and password.     Your access code is: [email or web address]  Expires: 2018  8:11 PM     Your access code will  in 90 days. If you need help or a new code, please call your [facility] clinic or [phone].        Care EveryWhere ID     This is your Care EveryWhere ID. This could be used by other organizations to access your [facility] medical records  [id number]        Equal Access to Services     IBAN DEVRIES : Hadii andrew pelletiero Sojason, waaxda luqadaha, qaybta kaalmada tor, waxay lev maya adeolman enriquez . So [facility] [phone].    ATENCIÓN: Si habla español, tiene a correa disposición servicios gratuitos de asistencia lingüística. Llame al [phone].    We comply with applicable federal civil rights laws and [place] laws. We do not discriminate on the basis of race, color, national origin, age, disability, sex, sexual orientation, or gender identity.          "   After Visit Summary       This is your record. Keep this with you and show to your community pharmacist(s) and doctor(s) at your next visit.

## 2018-08-05 NOTE — DISCHARGE INSTRUCTIONS
Closed Hand Fracture (Adult)  You have a fracture, or broken bone, in your hand. This may be a small crack or chip in the bone. Or it may be a major break with the broken parts pushed out of place. A closed fracture means that the broken bone has not gone through the skin. A hand fracture is treated with a splint or cast. It usually takes 4 to 6 weeks to heal. Severe injuries may require surgery.     Home care    Keep your arm elevated to reduce pain and swelling. When sitting or lying down, elevate your arm above the level of your heart. You can do this by placing your arm on a pillow that rests on your chest or on a pillow at your side. This is most important during the first 48 hours after injury.    Apply an ice pack over the injured area for no more than 15 to 20 minutes. Do this every 1 to 2 hours for the first 24 to 48 hours. Continue with ice packs as needed to ease pain and swelling. To make an ice pack, put ice cubes in a plastic bag that seals at the top. Wrap the bag in a clean, thin towel or cloth. Never put ice or an ice pack directly on the skin. You can place the ice pack inside the sling and directly over the cast or splint. As the ice melts, be careful that the cast or splint doesn t get wet.    Keep the cast or splint completely dry at all times. Bathe with your cast or splint out of the water, protected with 2 large plastic bags. Place 1 bag outside the other. Tape each bag with duct tape at the top end. If a fiberglass cast or splint gets wet, dry it with a hair dryer on a cool setting.    You may use over-the-counter pain medicine to control pain, unless another pain medicine was prescribed. If you have chronic liver or kidney disease or ever had a stomach ulcer or GI bleeding, talk with your provider beforeusing these medicines.  Follow-up care  Follow up with your healthcare provider within 1 week, or as advised. This is to be sure the bone is healing properly. If you were given a splint,  it may be changed to a cast at your follow-up visit.  If X-rays were taken, you will be told of any new findings that may affect your care.  When to seek medical advice  Call your healthcare provider right away if any of these occur:    The plaster cast or splint becomes wet or soft    The fiberglass cast or splint stays wet for more than 24 hours    The cast has a bad smell    The plaster cast or splint becomes loose    There is increased tightness or pain under the cast or splint    The fingers on your injured hand become swollen, cold, blue, numb, or tingly  Date Last Reviewed: 12/3/2015    7427-2263 [employer]. [address] [place] [address]. All rights reserved. This information is not intended as a substitute for professional medical care. Always follow your healthcare professional's instructions.
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
## 2018-08-05 NOTE — ED NOTES
Discharge instructions completed with patient with no questions or concerns. Splint placed by Dr. Nilo Keyes, sling placed by this writer. Patient aware of Rx to . Friend at bedside transporting patient.

## 2018-08-06 ENCOUNTER — OFFICE VISIT (OUTPATIENT)
Dept: FAMILY MEDICINE | Facility: OTHER | Age: 38
End: 2018-08-06
Attending: FAMILY MEDICINE
Payer: COMMERCIAL

## 2018-08-06 ENCOUNTER — TELEPHONE (OUTPATIENT)
Dept: FAMILY MEDICINE | Facility: OTHER | Age: 38
End: 2018-08-06

## 2018-08-06 VITALS
HEIGHT: 72 IN | HEART RATE: 72 BPM | TEMPERATURE: 98 F | DIASTOLIC BLOOD PRESSURE: 88 MMHG | BODY MASS INDEX: 39.68 KG/M2 | SYSTOLIC BLOOD PRESSURE: 132 MMHG | WEIGHT: 293 LBS | OXYGEN SATURATION: 95 %
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
## 2018-08-06 DIAGNOSIS — S63.502A WRIST SPRAIN, LEFT, INITIAL ENCOUNTER: ICD-10-CM

## 2018-08-06 PROCEDURE — 99213 OFFICE O/P EST LOW 20 MIN: CPT | Performed by: FAMILY MEDICINE

## 2018-08-06 RX ORDER — INDOMETHACIN 25 MG/1
50 CAPSULE ORAL 2 TIMES DAILY PRN
Qty: 42 CAPSULE | Refills: 1 | Status: SHIPPED | OUTPATIENT
Start: 2018-08-06 | End: 2022-02-11

## 2018-08-06 ASSESSMENT — ANXIETY QUESTIONNAIRES
3. WORRYING TOO MUCH ABOUT DIFFERENT THINGS: NOT AT ALL
2. NOT BEING ABLE TO STOP OR CONTROL WORRYING: NOT AT ALL
6. BECOMING EASILY ANNOYED OR IRRITABLE: SEVERAL DAYS
7. FEELING AFRAID AS IF SOMETHING AWFUL MIGHT HAPPEN: NOT AT ALL
5. BEING SO RESTLESS THAT IT IS HARD TO SIT STILL: NOT AT ALL
GAD7 TOTAL SCORE: 3
1. FEELING NERVOUS, ANXIOUS, OR ON EDGE: SEVERAL DAYS

## 2018-08-06 ASSESSMENT — PAIN SCALES - GENERAL: PAINLEVEL: SEVERE PAIN (7)

## 2018-08-06 ASSESSMENT — PATIENT HEALTH QUESTIONNAIRE - PHQ9: 5. POOR APPETITE OR OVEREATING: SEVERAL DAYS
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
## 2018-08-06 NOTE — NURSING NOTE
"Chief Complaint   Patient presents with     ER F/U       Initial /88  Pulse 72  Temp 98  F (36.7  C)  Ht 5' 11.5\" (1.816 m)  Wt 293 lb (132.9 kg)  SpO2 95%  BMI 40.3 kg/m2 Estimated body mass index is 40.3 kg/(m^2) as calculated from the following:    Height as of this encounter: 5' 11.5\" (1.816 m).    Weight as of this encounter: 293 lb (132.9 kg).  Medication Reconciliation: complete    Bree Bautista LPN  "

## 2018-08-07 ASSESSMENT — ANXIETY QUESTIONNAIRES: GAD7 TOTAL SCORE: 3

## 2018-08-07 ASSESSMENT — PATIENT HEALTH QUESTIONNAIRE - PHQ9: SUM OF ALL RESPONSES TO PHQ QUESTIONS 1-9: 0

## 2019-02-27 ENCOUNTER — TELEPHONE (OUTPATIENT)
Dept: FAMILY MEDICINE | Facility: OTHER | Age: 39
End: 2019-02-27

## 2019-02-27 ENCOUNTER — OFFICE VISIT (OUTPATIENT)
Dept: FAMILY MEDICINE | Facility: OTHER | Age: 39
End: 2019-02-27
Attending: PHYSICIAN ASSISTANT
Payer: COMMERCIAL

## 2019-02-27 ENCOUNTER — ANCILLARY PROCEDURE (OUTPATIENT)
Dept: GENERAL RADIOLOGY | Facility: OTHER | Age: 39
End: 2019-02-27
Attending: PHYSICIAN ASSISTANT
Payer: COMMERCIAL

## 2019-02-27 VITALS
WEIGHT: 295.2 LBS | HEART RATE: 82 BPM | TEMPERATURE: 97.4 F | BODY MASS INDEX: 41.33 KG/M2 | OXYGEN SATURATION: 98 % | DIASTOLIC BLOOD PRESSURE: 76 MMHG | HEIGHT: 71 IN | SYSTOLIC BLOOD PRESSURE: 126 MMHG

## 2019-02-27 DIAGNOSIS — M54.16 LUMBAR RADICULOPATHY: Primary | ICD-10-CM

## 2019-02-27 DIAGNOSIS — M54.16 LUMBAR RADICULOPATHY: ICD-10-CM

## 2019-02-27 PROCEDURE — 72100 X-RAY EXAM L-S SPINE 2/3 VWS: CPT | Mod: TC

## 2019-02-27 PROCEDURE — 99213 OFFICE O/P EST LOW 20 MIN: CPT | Performed by: PHYSICIAN ASSISTANT

## 2019-02-27 RX ORDER — HYDROCODONE BITARTRATE AND ACETAMINOPHEN 5; 325 MG/1; MG/1
TABLET ORAL
Qty: 18 TABLET | Refills: 0 | Status: SHIPPED | OUTPATIENT
Start: 2019-02-27 | End: 2023-07-31

## 2019-02-27 RX ORDER — PREDNISONE 20 MG/1
TABLET ORAL
Qty: 15 TABLET | Refills: 0 | Status: SHIPPED | OUTPATIENT
Start: 2019-02-27 | End: 2023-07-31

## 2019-02-27 RX ORDER — CYCLOBENZAPRINE HCL 10 MG
TABLET ORAL
Qty: 20 TABLET | Refills: 0 | Status: SHIPPED | OUTPATIENT
Start: 2019-02-27 | End: 2022-02-11

## 2019-02-27 ASSESSMENT — MIFFLIN-ST. JEOR: SCORE: 2263.21

## 2019-02-27 ASSESSMENT — PAIN SCALES - GENERAL: PAINLEVEL: WORST PAIN (10)
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
## 2019-02-27 NOTE — NURSING NOTE
"Chief Complaint   Patient presents with     Musculoskeletal Problem       Initial /76 (BP Location: Right arm, Patient Position: Sitting, Cuff Size: Adult Large)   Pulse 82   Temp 97.4  F (36.3  C) (Tympanic)   Ht 1.791 m (5' 10.5\")   Wt 133.9 kg (295 lb 3.2 oz)   SpO2 98%   BMI 41.76 kg/m   Estimated body mass index is 41.76 kg/m  as calculated from the following:    Height as of this encounter: 1.791 m (5' 10.5\").    Weight as of this encounter: 133.9 kg (295 lb 3.2 oz).  Medication Reconciliation: complete    Nguyen Sahu LPN  "

## 2019-03-01 ENCOUNTER — HOSPITAL ENCOUNTER (OUTPATIENT)
Dept: MRI IMAGING | Facility: HOSPITAL | Age: 39
Discharge: HOME | End: 2019-03-01
Attending: PHYSICIAN ASSISTANT | Admitting: PHYSICIAN ASSISTANT
Payer: COMMERCIAL

## 2019-03-01 DIAGNOSIS — M54.16 LUMBAR RADICULOPATHY: ICD-10-CM

## 2019-03-01 PROCEDURE — 72148 MRI LUMBAR SPINE W/O DYE: CPT | Mod: TC

## 2019-03-04 ENCOUNTER — OFFICE VISIT (OUTPATIENT)
Dept: FAMILY MEDICINE | Facility: OTHER | Age: 39
End: 2019-03-04
Attending: PHYSICIAN ASSISTANT
Payer: COMMERCIAL

## 2019-03-04 ENCOUNTER — MEDICAL CORRESPONDENCE (OUTPATIENT)
Dept: HEALTH INFORMATION MANAGEMENT | Facility: CLINIC | Age: 39
End: 2019-03-04

## 2019-03-04 VITALS
BODY MASS INDEX: 41.02 KG/M2 | TEMPERATURE: 97.5 F | HEIGHT: 71 IN | WEIGHT: 293 LBS | SYSTOLIC BLOOD PRESSURE: 115 MMHG | OXYGEN SATURATION: 98 % | DIASTOLIC BLOOD PRESSURE: 58 MMHG | HEART RATE: 85 BPM

## 2019-03-04 DIAGNOSIS — M54.16 LUMBAR RADICULOPATHY: Primary | ICD-10-CM

## 2019-03-04 PROCEDURE — 99213 OFFICE O/P EST LOW 20 MIN: CPT | Performed by: PHYSICIAN ASSISTANT

## 2019-03-04 ASSESSMENT — PAIN SCALES - GENERAL: PAINLEVEL: MODERATE PAIN (5)

## 2019-03-04 ASSESSMENT — MIFFLIN-ST. JEOR: SCORE: 2261.17

## 2019-03-04 NOTE — PROGRESS NOTES
SUBJECTIVE:   Tristan Melgar is a 38 year old male who presents to clinic today for the following health issues:  F/u lumbar radiculopathy    Chronic Pain Follow-Up       Type / Location of Pain: Back Pain/Broke back in 2006- L1- Compression Fracture    Analgesia/pain control:       Recent changes:  improved      Overall control: Comfortably manageable  Activity level/function:      Daily activities:  Able to do all daily activities    Work:  Work full time- some , limitations  Adverse effects:  No  Adherance    Taking medication as directed?  Yes    Participating in other treatments: no  Risk Factors:    Sleep:  Good    Mood/anxiety:  controlled    Recent family or social stressors:  none noted    Other aggravating factors: none  PHQ-9 SCORE 6/13/2017 7/13/2017 8/6/2018   PHQ-9 Total Score 2 0 0     FLORENTINO-7 SCORE 7/13/2017 8/6/2018   Total Score 0 3     Encounter-Level CSA:    There are no encounter-level csa.     Patient-Level CSA:    There are no patient-level csa.         Amount of exercise or physical activity: moderate- physical job     Problems taking medications regularly: No    Medication side effects: none    Diet: regular (no restrictions)        Recheck: MRI results     Problem list and histories reviewed & adjusted, as indicated.  Additional history:     Patient Active Problem List   Diagnosis     Gastroesophageal reflux disease with esophagitis     ACP (advance care planning)     Past Surgical History:   Procedure Laterality Date     ESOPHAGOSCOPY, GASTROSCOPY, DUODENOSCOPY (EGD), COMBINED N/A 6/26/2015    Procedure: COMBINED ESOPHAGOSCOPY, GASTROSCOPY, DUODENOSCOPY (EGD);  Surgeon: Jasper Lima DO;  Location: [facility]     NO HISTORY OF SURGERY       ORTHOPEDIC SURGERY Left 2011    meniscus, knee       Social History     Tobacco Use     Smoking status: Never Smoker     Smokeless tobacco: Never Used   Substance Use Topics     Alcohol use: Yes     Comment: socially     Family History   Problem  "Relation Age of Onset     Other Cancer Maternal Grandmother         throat cancer     Other Cancer Maternal Grandfather      Other Cancer Paternal Grandmother         pancreatic cancer     Other Cancer Paternal Grandfather         tumor on the spine         Current Outpatient Medications   Medication Sig Dispense Refill     cyclobenzaprine (FLEXERIL) 10 MG tablet 1 tabs HS prn 20 tablet 0     diphenhydrAMINE (BENADRYL) 25 MG tablet Take 50 mg by mouth every 6 hours as needed for itching or allergies       Fexofenadine HCl (ALLEGRA ALLERGY PO) Take 180 mg by mouth daily       indomethacin (INDOCIN) 25 MG capsule Take 2 capsules (50 mg) by mouth 2 times daily as needed for moderate pain 42 capsule 1     Loperamide HCl (IMODIUM A-D PO) Take by mouth as needed       Multiple Vitamins-Minerals (MULTIVITAMIN ADULT PO) Take 1 tablet by mouth daily       omeprazole (PRILOSEC) 40 MG capsule Take 1 capsule (40 mg) by mouth daily Take 30-60 minutes before a meal. 90 capsule 1     EPINEPHrine (EPIPEN 2-KENZIE) 0.3 MG/0.3ML injection Inject 0.3 mLs (0.3 mg) into the muscle once as needed for anaphylaxis (Patient not taking: Reported on 3/4/2019) 1 each 1     HYDROcodone-acetaminophen (NORCO) 5-325 MG tablet 1-2 tabs HS prn (Patient not taking: Reported on 3/4/2019) 18 tablet 0     predniSONE (DELTASONE) 20 MG tablet 3 tabs once daily for 5 days. (Patient not taking: Reported on 3/4/2019.) 15 tablet 0     No Known Allergies    Reviewed and updated as needed this visit by clinical staff  Allergies  Meds       Reviewed and updated as needed this visit by Provider         ROS:  Constitutional, HEENT, cardiovascular, pulmonary, gi and gu systems are negative, except as otherwise noted.    OBJECTIVE:                                                    /58 (BP Location: Right arm, Patient Position: Chair, Cuff Size: Adult Large)   Pulse 85   Temp 97.5  F (36.4  C) (Tympanic)   Ht 1.803 m (5' 11\")   Wt 132.9 kg (293 lb)   SpO2 " 98%   BMI 40.87 kg/m     Body mass index is 40.87 kg/m .  GENERAL APPEARANCE: healthy, alert and no distress  MS: TTP right lumbar paraspinal muscles. Lower DTR's 2+ symmetric  SKIN: no suspicious lesions or rashes  PSYCH: mentation appears normal and affect normal/bright         ASSESSMENT/PLAN:                                                    1. Lumbar radiculopathy  Reviewed MRI. Degenerative discs throughout. Start PT with f/u if no improvment  - PHYSICAL THERAPY REFERRAL; Future      Follow up if symptoms persist or worsen.      GEOVANNI Nicholson  [facility]

## 2021-08-17 ENCOUNTER — ALLIED HEALTH/NURSE VISIT (OUTPATIENT)
Dept: FAMILY MEDICINE | Facility: OTHER | Age: 41
End: 2021-08-17
Attending: FAMILY MEDICINE
Payer: COMMERCIAL

## 2021-08-17 DIAGNOSIS — Z20.822 EXPOSURE TO COVID-19 VIRUS: Primary | ICD-10-CM

## 2021-08-17 LAB — SARS-COV-2 RNA RESP QL NAA+PROBE: NEGATIVE

## 2021-08-17 PROCEDURE — U0003 INFECTIOUS AGENT DETECTION BY NUCLEIC ACID (DNA OR RNA); SEVERE ACUTE RESPIRATORY SYNDROME CORONAVIRUS 2 (SARS-COV-2) (CORONAVIRUS DISEASE [COVID-19]), AMPLIFIED PROBE TECHNIQUE, MAKING USE OF HIGH THROUGHPUT TECHNOLOGIES AS DESCRIBED BY CMS-2020-01-R: HCPCS | Mod: ZL

## 2021-08-17 PROCEDURE — C9803 HOPD COVID-19 SPEC COLLECT: HCPCS

## 2021-08-17 NOTE — NURSING NOTE
Chief Complaint   Patient presents with     Covid 19 Testing     Exposure     Patient swabbed for COVID-19 testing.  Jess Rowe LPN on 8/17/2021 at 10:24 AM

## 2021-10-03 ENCOUNTER — HEALTH MAINTENANCE LETTER (OUTPATIENT)
Age: 41
End: 2021-10-03

## 2021-11-23 ENCOUNTER — ALLIED HEALTH/NURSE VISIT (OUTPATIENT)
Dept: FAMILY MEDICINE | Facility: OTHER | Age: 41
End: 2021-11-23
Attending: FAMILY MEDICINE
Payer: COMMERCIAL

## 2021-11-23 DIAGNOSIS — R09.89 CHEST CONGESTION: ICD-10-CM

## 2021-11-23 DIAGNOSIS — Z20.822 COVID-19 RULED OUT: Primary | ICD-10-CM

## 2021-11-23 PROCEDURE — U0003 INFECTIOUS AGENT DETECTION BY NUCLEIC ACID (DNA OR RNA); SEVERE ACUTE RESPIRATORY SYNDROME CORONAVIRUS 2 (SARS-COV-2) (CORONAVIRUS DISEASE [COVID-19]), AMPLIFIED PROBE TECHNIQUE, MAKING USE OF HIGH THROUGHPUT TECHNOLOGIES AS DESCRIBED BY CMS-2020-01-R: HCPCS | Mod: ZL

## 2021-11-23 PROCEDURE — C9803 HOPD COVID-19 SPEC COLLECT: HCPCS

## 2021-11-25 LAB — SARS-COV-2 RNA RESP QL NAA+PROBE: NEGATIVE

## 2022-02-11 ENCOUNTER — NURSE TRIAGE (OUTPATIENT)
Dept: FAMILY MEDICINE | Facility: OTHER | Age: 42
End: 2022-02-11

## 2022-02-11 ENCOUNTER — OFFICE VISIT (OUTPATIENT)
Dept: FAMILY MEDICINE | Facility: OTHER | Age: 42
End: 2022-02-11
Attending: NURSE PRACTITIONER

## 2022-02-11 VITALS
WEIGHT: 310 LBS | RESPIRATION RATE: 20 BRPM | OXYGEN SATURATION: 99 % | BODY MASS INDEX: 43.4 KG/M2 | SYSTOLIC BLOOD PRESSURE: 130 MMHG | HEART RATE: 72 BPM | DIASTOLIC BLOOD PRESSURE: 82 MMHG | TEMPERATURE: 97.1 F | HEIGHT: 71 IN

## 2022-02-11 DIAGNOSIS — J02.9 PHARYNGITIS, UNSPECIFIED ETIOLOGY: Primary | ICD-10-CM

## 2022-02-11 DIAGNOSIS — E66.01 MORBID OBESITY (H): ICD-10-CM

## 2022-02-11 LAB
FLUAV RNA SPEC QL NAA+PROBE: NEGATIVE
FLUBV RNA RESP QL NAA+PROBE: NEGATIVE
GROUP A STREP BY PCR: NOT DETECTED
RSV RNA SPEC NAA+PROBE: NEGATIVE
SARS-COV-2 RNA RESP QL NAA+PROBE: NEGATIVE

## 2022-02-11 PROCEDURE — 99213 OFFICE O/P EST LOW 20 MIN: CPT | Performed by: NURSE PRACTITIONER

## 2022-02-11 PROCEDURE — 87637 SARSCOV2&INF A&B&RSV AMP PRB: CPT | Performed by: NURSE PRACTITIONER

## 2022-02-11 PROCEDURE — 87651 STREP A DNA AMP PROBE: CPT | Performed by: NURSE PRACTITIONER

## 2022-02-11 ASSESSMENT — MIFFLIN-ST. JEOR: SCORE: 2323.28

## 2022-02-11 ASSESSMENT — PAIN SCALES - GENERAL: PAINLEVEL: WORST PAIN (10)

## 2022-02-11 NOTE — PROGRESS NOTES
"  Assessment & Plan     Pharyngitis, unspecified etiology  Start antibiotics. Stay home x 1 day minimum and also until results are known. Follow CDC if positive for COVID and/or influenza. I strongly recommend the COVID and influenza vaccinations when you feel better.   - Group A Streptococcus PCR Throat Swab ([place] ONLY)  - amoxicillin-clavulanate (AUGMENTIN) 875-125 MG tablet; Take 1 tablet by mouth 2 times daily for 10 days  - Symptomatic; Yes; 2/8/2022 Influenza A/B & SARS-CoV2 (COVID-19) Virus PCR Multiplex Nose; Future  - Symptomatic; Yes; 2/8/2022 Influenza A/B & SARS-CoV2 (COVID-19) Virus PCR Multiplex Nose    Morbid obesity ([facility])  Consider weight loss efforts through reducing daily calories. If you would like more information, please reach out. 5% weight loss goal over 3 months discussed to help reduce musculoskeletal and cardiac risk. Patient aware of concern with BMI. In pre contemplation  stage.   - Symptomatic; Yes; 2/8/2022 Influenza A/B & SARS-CoV2 (COVID-19) Virus PCR Multiplex Nose; Future  - Symptomatic; Yes; 2/8/2022 Influenza A/B & SARS-CoV2 (COVID-19) Virus PCR Multiplex Nose    Ordering of each unique test  Prescription drug management     BMI:   Estimated body mass index is 43.24 kg/m  as calculated from the following:    Height as of this encounter: 1.803 m (5' 11\").    Weight as of this encounter: 140.6 kg (310 lb).   Weight management plan: Discussed healthy diet and exercise guidelines      No follow-ups on file.    Aylin Mendes, [facility] - BECCA Hudson is a 41 year old who presents for the following health issues    HPI     Acute Illness  Acute illness concerns: sore throat  Onset/Duration: 4 days but did not get bad until last night.   Symptoms:  Fever: no  Chills/Sweats: YES  Headache (location?): YES  Sinus Pressure: YES  Conjunctivitis:  no  Ear Pain: YES-   Rhinorrhea: YES  Congestion: YES  Sore Throat: YES  Cough: YES  Wheeze: no  Decreased " "Appetite: YES  Nausea: no  Vomiting: no  Diarrhea: no  Dysuria/Freq.: no  Dysuria or Hematuria: no  Fatigue/Achiness: YES- fatigue  Sick/Strep Exposure: YES- nieces and nephews- sick. Unsure of a dx for them.   Therapies tried and outcome: None    Open to COVID oral and/or IV treatment if positive and meeting local criteria.       Review of Systems   Constitutional, HEENT, cardiovascular, pulmonary, gi and gu systems are negative, except as otherwise noted.      Objective    /82 (BP Location: Left arm, Patient Position: Sitting, Cuff Size: Adult Large)   Pulse 72   Temp 97.1  F (36.2  C) (Tympanic)   Resp 20   Ht 1.803 m (5' 11\")   Wt 140.6 kg (310 lb)   SpO2 99%   BMI 43.24 kg/m    Body mass index is 43.24 kg/m .  Physical Exam   GENERAL: healthy, alert and no distress  EYES: Eyes grossly normal to inspection, PERRL and conjunctivae and sclerae normal  HENT: normal cephalic/atraumatic, ear canals and TM's normal,oral mucous membranes moist, tonsillar hypertrophy, tonsillar erythema, tonsillar exudate and sinuses: maxillary, frontal tenderness on bilateral  NECK: no adenopathy, no asymmetry, masses, or scars and thyroid normal to palpation  RESP: lungs clear to auscultation - no rales, rhonchi or wheezes  CV: regular rate and rhythm, normal S1 S2, no S3 or S4, no murmur, click or rub, no peripheral edema and peripheral pulses strong  NEURO: Normal strength and tone, mentation intact and speech normal  PSYCH: mentation appears normal, affect normal/bright      Results for orders placed or performed in visit on 02/11/22   Symptomatic; Yes; 2/8/2022 Influenza A/B & SARS-CoV2 (COVID-19) Virus PCR Multiplex Nose     Status: Normal    Specimen: Nose; Swab   Result Value Ref Range    Influenza A PCR Negative Negative    Influenza B PCR Negative Negative    RSV PCR Negative Negative    SARS CoV2 PCR Negative Negative    Narrative    Testing was performed using the Xpert Xpress CoV2/Flu/RSV Assay on the [employer] " [employer] Instrument. This test should be ordered for the detection of SARS-CoV-2 and influenza viruses in individuals who meet clinical and/or epidemiological criteria. Test performance is unknown in asymptomatic patients. This test is for in vitro diagnostic use under the FDA EUA for laboratories certified under CLIA to perform high or moderate complexity testing. This test has not been FDA cleared or approved. A negative result does not rule out the presence of PCR inhibitors in the specimen or target RNA in concentration below the limit of detection for the assay. If only one viral target is positive but coinfection with multiple targets is suspected, the sample should be re-tested with another FDA cleared, approved, or authorized test, if coinfection would change clinical management. This test was validated by the [facility]. These laboratories are certified under the Clinical  Laboratory Improvement Amendments of 1988 (CLIA-88) as qualified to perform high complexity laboratory testing.   Group A Streptococcus PCR Throat Swab ([place] ONLY)     Status: Normal    Specimen: Throat; Swab   Result Value Ref Range    Group A strep by PCR Not Detected Not Detected    Narrative    The Xpert Xpress Strep A test, performed on the [employer]  Instrument Systems, is a rapid, qualitative in vitro diagnostic test for the detection of Streptococcus pyogenes (Group A ß-hemolytic Streptococcus, Strep A) in throat swab specimens from patients with signs and symptoms of pharyngitis. The Xpert Xpress Strep A test can be used as an aid in the diagnosis of Group A Streptococcal pharyngitis. The assay is not intended to monitor treatment for Group A Streptococcus infections. The Xpert Xpress Strep A test utilizes an automated real-time polymerase chain reaction (PCR) to detect Streptococcus pyogenes DNA.

## 2022-02-11 NOTE — PATIENT INSTRUCTIONS
Patient Education     Tonsillitis in Adults  Tonsillitis is swelling and redness (inflammation) of the tonsils. It happens when the tonsils are infected by a virus or a bacteria. Your tonsils are 2 pink, oval lymph glands at the back of your throat. They are part of your immune system, which helps your body fight infection. They react when germs get inside your nose and mouth.  Tonsillitis is very common. It is most often seen in children, but it can also occur in young adults.  The viruses and bacteria that cause tonsillitis can be easily passed from one person to another.    What causes tonsillitis?  Tonsillitis is most often caused by a virus.  Common viruses that cause tonsillitis include:    Cold viruses    Adenoviruses    Vicky-Barr virus    Infectious mononucleosis    Herpes simplex virus (HSV)    Cytomegalovirus    Measles  In some cases tonsillitis is caused by a bacteria. Bacterial tonsillitis is often called strep throat. The most common type of bacteria that causes tonsillitis is GABS (Group A beta-hemolytic streptococcus). The bacteria is spread through droplets in the air. This happens when someone with the virus coughs or sneezes. It can also be spread by sharing food or drinks.  Symptoms of tonsillitis  Symptoms will depend on which type of tonsillitis you have. There are several types of tonsillitis.  Acute tonsillitis  Symptoms for this type often go away in a few days. But they can last up to 2 weeks. In some cases symptoms come back after treatment is done (acute recurrent tonsillitis). Symptoms include:    Fever    Sore throat    Bad breath    Trouble swallowing    Fluid loss (dehydration)    Sore lymph nodes in the neck    Tiredness    Snoring, sleep apnea, or breathing through the mouth    White patches, pus, or red tonsils    A red rash on the body  Chronic tonsillitis  For this type, the infection or inflammation lasts for a few months. Symptoms include:    Lasting sore throat    Bad  breath    Lasting sore lymph nodes in the neck    Bacteria and debris collecting on the tonsils (called tonsil stones)  Peritonsillar abscess  This is a severe form of tonsillitis. It occurs when a pocket of pus (an abscess) forms around the tonsil. You need treatment right away. This can help stop the abscess from blocking your airway. Symptoms include:    Severe throat pain    Trouble opening the mouth    Drooling    Voice sounds muffled    One tonsil may look larger  Diagnosing tonsillitis  If you have symptoms, see your primary healthcare provider. Or see an ear, nose, and throat doctor (ENT or otolaryngologist).  The provider will ask about your symptoms. He or she will also check your ear, nose, and throat for any swelling and infection. The provider will then swab your tonsils or the back of your throat. This sample can be checked in the provider s office for strep throat. This is called a rapid strep test. Results are ready in a few minutes. But there can be false negatives with this test. So the provider will likely also send the sample out to a lab for testing (throat culture). The lab results will take 24 hours or longer. But a throat culture is more accurate.  Treatment for tonsillitis  Treatment will depend on what is causing the tonsillitis. If it s caused by bacteria, then your provider may prescribe antibiotics to help you recover. Finish all of the medicine even if you start to feel better.  Tonsillitis caused by a virus can t be treated with antibiotics. This kind of infection often goes away on its own. Home care may be all that you need, with rest and fluids. Follow these tips to help ease your symptoms at home:    Get plenty of rest.    Drink lots of fluids, such as soup, broth, and tea with honey and lemon.    East soft foods such as ice cream, applesauce, and flavored gelatins.    Gargle with warm saltwater.    Use over-the-counter throat sprays or lozenges for throat pain.    Take  over-the-counter medicine for fever and pain, as directed.    Use a cool-mist humidifier to keep the air moist.  In severe cases, a person may be dehydrated or have a blocked airway. They may need to be hospitalized.  Surgery to remove the tonsils (tonsillectomy) may be needed if you have any of these:    Chronic tonsillitis    Tonsillitis that keeps coming back    Obstructive sleep apnea    Acute recurrent tonsillitis  If you have a peritonsillar abscess, surgery may be done to drain the abscess.  Preventing tonsillitis  Tonsillitis itself can t spread. But the virus and bacteria that cause it can be passed to other people.  No vaccine or medicine can prevent tonsillitis. These tips can help keep you from spreading or catching an illness that can cause tonsillitis:    Stay away from anyone with tonsillitis or a sore throat as much as possible.    Don't share utensils, drinking glasses, toothbrushes, or other personal objects with anyone who has tonsillitis or a sore throat.    Wash your hands correctly. Wash them often with soap and water often. Use hand  when you can t wash your hands.    Cover your mouth when you cough or sneeze.  Call 911  Call 911 if you have any of these symptoms:    Trouble breathing or speaking    Trouble swallowing or opening your mouth    Swollen mouth and throat    Drooling  When to call your healthcare provider  Call your healthcare provider if you have any of these symptoms:    Fever of 100.4 F (38 C) or higher, or as directed by your provider    A lump that gets larger    Worsening throat pain or neck pain    Unable to open your mouth fully (called lockjaw or trismus)    Neck stiffness    Bleeding    Painful swallowing    Feeling very ill or sick    Sore throat for more than 2 days     Shimon last reviewed this educational content on 7/1/2019 2000-2021 [employer]. All rights reserved. This information is not intended as a substitute for professional medical  "care. Always follow your healthcare professional's instructions.           Patient Education   After Your COVID-19 (Coronavirus) Test  You have been tested for COVID-19 (coronavirus).   If you'll have surgery in the next few days, we'll let you know ahead of time if you have the virus. Please call your surgeon's office with any questions.  For all other patients: Results are usually available in [employer] within 2 to 3 days.   If you do not have a [employer] account, you'll get a letter in the mail in about 7 to 10 days.   [facility] is often the fastest way to get test results. Please sign up if you do not already have a [employer] account. See the handout Getting COVID-19 Test Results in [employer] for help.  What if my test result is positive?  If your test is positive and you have not viewed your result in [facility], you'll get a phone call with your result. (A positive test means that you have the virus.)     Follow the tips under \"How do I self-isolate?\" below for 10 days (20 days if you have a weak immune system).    You don't need to be retested for COVID-19 before going back to school or work. As long as you're fever-free and feeling better, you can go back to school, work and other activities after waiting the 10 or 20 days.  What if I have questions after I get my results?  If you have questions about your results, please visit our testing website at [email or web address].   After 7 to 10 days, if you have not gotten your results:     Call [phone] ([phone]) and ask to speak with our COVID-19 results team.    If you're being treated at an infusion center: Call your infusion center directly.  What are the symptoms of COVID-19?  Cough, fever and trouble breathing are the most common signs of COVID-19.  Other symptoms can include new headaches, new muscle or body aches, new and unexplained fatigue (feeling very tired), chills, sore throat, congestion (stuffy or runny nose), diarrhea " "(loose poop), loss of taste or smell, belly pain, and nausea or vomiting (feeling sick to your stomach or throwing up).  You may already have symptoms of COVID-19, or they may show up later.  What should I do if I have symptoms?  If you're having surgery: Call your surgeon's office.  For all other patients: Stay home and away from others (self-isolate) until ...    You've had no fever--and no medicine that reduces fever--for 1 full day (24 hours), AND    Other symptoms have gotten better. For example, your cough or breathing has improved, AND    At least 10 days have passed since your symptoms first started.  How do I self-isolate?    Stay in your own room, even for meals. Use your own bathroom if you can.    Stay away from others in your home. No hugging, kissing or shaking hands. No visitors.    Don't go to work, school or anywhere else.    Clean \"high touch\" surfaces often (doorknobs, counters, handles). Use household cleaning spray or wipes. You'll find a full list of  on the EPA website: www.epa.gov/pesticide-registration/list-n-disinfectants-use-against-sars-cov-2.    Cover your mouth and nose with a mask or other face covering to avoid spreading germs.    Wash your hands and face often. Use soap and water.    Caregivers in these groups are at risk for severe illness due to COVID-19:  ? People 65 years and older  ? People who live in a nursing home or long-term care facility  ? People with chronic disease (lung, heart, cancer, diabetes, kidney, liver, immunologic)  ? People who have a weakened immune system, including those who:    Are in cancer treatment    Take medicine that weakens the immune system, such as corticosteroids    Had a bone marrow or organ transplant    Have an immune deficiency    Have poorly controlled HIV or AIDS    Are obese (body mass index of 40 or higher)    Smoke regularly    Caregivers should wear gloves while washing dishes, handling laundry and cleaning bedrooms and " bathrooms.    Use caution when washing and drying laundry: Don't shake dirty laundry and use the warmest water setting that you can.    For more tips on managing your health at home, go to www.cdc.gov/coronavirus/2019-ncov/downloads/10Things.pdf.  How can I take care of myself at home?  1. Get lots of rest. Drink extra fluids (unless a doctor has told you not to).  2. Take Tylenol (acetaminophen) for fever or pain. If you have liver or kidney problems, ask your family doctor if it's OK to take Tylenol.   Adults can take either:  ? 650 mg (two 325 mg pills) every 4 to 6 hours, or   ? 1,000 mg (two 500 mg pills) every 8 hours as needed.  ? Note: Don't take more than 3,000 mg in one day. Acetaminophen is found in many medicines (both prescribed and over-the-counter medicines). Read all labels to be sure you don't take too much.   For children, check the Tylenol bottle for the right dose. The dose is based on the child's age or weight.  3. If you have other health problems (like cancer, heart failure, an organ transplant or severe kidney disease): Call your specialty clinic if you don't feel better in the next 2 days.  4. Know when to call 911. Emergency warning signs include:  ? Trouble breathing or shortness of breath  ? Chest pain or pressure that doesn't go away  ? Feeling confused like you haven't felt before, or not being able to wake up  ? Bluish-colored lips or face  5. If your doctor prescribed a blood thinner medicine: Follow their instructions.  Where can I get more information?     [facility] [place] - About COVID-19:   [email or web address]    CDC - If You're Sick: cdc.gov/coronavirus/2019-ncov/about/steps-when-sick.html    CDC - Ending Home Isolation: www.cdc.gov/coronavirus/2019-ncov/hcp/disposition-in-home-patients.html    CDC - Caring for Someone: www.cdc.gov/coronavirus/2019-ncov/if-you-are-sick/care-for-someone.html    [facility] - Interim Guidance for Hospital Discharge to Home:  www.health.[email or web address].[place]/diseases/[email or web address]/hcp/hospdischarge.pdf    [facility] clinical trials (COVID-19 research studies): [email or web address]    Below are the COVID-19 hotlines at the [place] Department of Health ([facility]). Interpreters are available.  ? For health questions: Call 374-642-7335 or 1-357.109.8432 (7 a.m. to 7 p.m.)  ? For questions about schools and childcare: Call 640-762-8624 or 1-769.550.1293 (7 a.m. to 7 p.m.)    For informational purposes only. Not to replace the advice of your health care provider. Clinically reviewed by Infection Prevention and the [facility] COVID-19 Clinical Team. Copyright   2020 [facility]. All rights reserved. [employer] [id number] - Rev 11/11/20.

## 2022-02-11 NOTE — NURSING NOTE
"Chief Complaint   Patient presents with     Pharyngitis       Initial /82 (BP Location: Left arm, Patient Position: Sitting, Cuff Size: Adult Large)   Pulse 72   Temp 97.1  F (36.2  C) (Tympanic)   Resp 20   Ht 1.803 m (5' 11\")   Wt 140.6 kg (310 lb)   SpO2 99%   BMI 43.24 kg/m   Estimated body mass index is 43.24 kg/m  as calculated from the following:    Height as of this encounter: 1.803 m (5' 11\").    Weight as of this encounter: 140.6 kg (310 lb).  Medication Reconciliation: complete  Olesya Cooper LPN    "

## 2022-02-11 NOTE — TELEPHONE ENCOUNTER
"Protocol advises patient to be seen within 3 days for a sore throat that started on Monday. Patient is scheduled today with covering provider.   Next 5 appointments (look out 90 days)    Feb 11, 2022  9:00 AM  (Arrive by 8:45 AM)  SHORT with Aylin Mendes CNP  [facility] [place] ([facility] [place] ) [address]  [place] [address]  [phone]            Reason for Disposition    [1] Sore throat with cough/cold symptoms AND [2] present > 5 days    Additional Information    Negative: Severe difficulty breathing (e.g., struggling for each breath, speaks in single words, stridor)    Negative: Sounds like a life-threatening emergency to the triager    Negative: [1] Diagnosed strep throat AND [2] taking antibiotic AND [3] symptoms continue    Negative: Throat culture results, call about    Negative: Productive cough is main symptom    Negative: Non-productive cough is main symptom    Negative: Hoarseness is main symptom    Negative: Runny nose is main symptom    Negative: [1] Drooling or spitting out saliva (because can't swallow) AND [2] normal breathing    Negative: Unable to open mouth completely    Negative: [1] Difficulty breathing AND [2] not severe    Negative: Fever > 104 F (40 C)    Negative: [1] Refuses to drink anything AND [2] for > 12 hours    Negative: [1] Drinking very little AND [2] dehydration suspected (e.g., no urine > 12 hours, very dry mouth, very lightheaded)    Negative: Patient sounds very sick or weak to the triager    Negative: SEVERE (e.g., excruciating) throat pain    Negative: [1] Pus on tonsils (back of throat) AND [2]  fever AND [3] swollen neck lymph nodes (\"glands\")    Negative: [1] Rash AND [2] widespread (especially chest and abdomen)    Negative: Earache also present    Negative: Fever present > 3 days (72 hours)    Negative: Diabetes mellitus or weak immune system (e.g., HIV positive, cancer chemo, splenectomy, organ transplant, chronic " "steroids)    Negative: History of rheumatic fever    Negative: [1] Adult is leaving on a trip AND [2] requests an antibiotic NOW    Negative: [1] Positive throat culture or rapid strep test (according to lab, PCP, caller, etc.) AND [2] NO  standing order to call in prescription for antibiotic    Negative: [1] Exposure to family member (or spouse or boyfriend/girlfriend) with test-proven strep AND [2] within last 10 days    Negative: [1] Sore throat is the only symptom AND [2] present > 48 hours    Answer Assessment - Initial Assessment Questions  1. ONSET: \"When did the throat start hurting?\" (Hours or days ago)       Monday  2. SEVERITY: \"How bad is the sore throat?\" (Scale 1-10; mild, moderate or severe)    - MILD (1-3):  doesn't interfere with eating or normal activities    - MODERATE (4-7): interferes with eating some solids and normal activities    - SEVERE (8-10):  excruciating pain, interferes with most normal activities    - SEVERE DYSPHAGIA: can't swallow liquids, drooling      Hard to talk and swallow and constant burning in the throat  3. STREP EXPOSURE: \"Has there been any exposure to strep within the past week?\" If so, ask: \"What type of contact occurred?\"       no  4.  VIRAL SYMPTOMS: \"Are there any symptoms of a cold, such as a runny nose, cough, hoarse voice or red eyes?\"       Runny nose  5. FEVER: \"Do you have a fever?\" If so, ask: \"What is your temperature, how was it measured, and when did it start?\"      no  6. PUS ON THE TONSILS: \"Is there pus on the tonsils in the back of your throat?\"      White spots on the back of the throat and a lot of phlegm  7. OTHER SYMPTOMS: \"Do you have any other symptoms?\" (e.g., difficulty breathing, headache, rash)      headache  8. PREGNANCY: \"Is there any chance you are pregnant?\" \"When was your last menstrual period?\"      NA    Protocols used: SORE THROAT-A-AH      "

## 2022-09-04 ENCOUNTER — HEALTH MAINTENANCE LETTER (OUTPATIENT)
Age: 42
End: 2022-09-04

## 2023-01-15 ENCOUNTER — HEALTH MAINTENANCE LETTER (OUTPATIENT)
Age: 43
End: 2023-01-15

## 2023-07-31 ENCOUNTER — TELEPHONE (OUTPATIENT)
Dept: FAMILY MEDICINE | Facility: OTHER | Age: 43
End: 2023-07-31

## 2023-07-31 ENCOUNTER — OFFICE VISIT (OUTPATIENT)
Dept: FAMILY MEDICINE | Facility: OTHER | Age: 43
End: 2023-07-31
Attending: FAMILY MEDICINE
Payer: COMMERCIAL

## 2023-07-31 VITALS
OXYGEN SATURATION: 98 % | HEART RATE: 75 BPM | TEMPERATURE: 97.9 F | SYSTOLIC BLOOD PRESSURE: 120 MMHG | BODY MASS INDEX: 45.61 KG/M2 | WEIGHT: 315 LBS | DIASTOLIC BLOOD PRESSURE: 76 MMHG

## 2023-07-31 DIAGNOSIS — L23.7 CONTACT DERMATITIS DUE TO POISON IVY: Primary | ICD-10-CM

## 2023-07-31 PROCEDURE — 99213 OFFICE O/P EST LOW 20 MIN: CPT | Performed by: FAMILY MEDICINE

## 2023-07-31 RX ORDER — TRIAMCINOLONE ACETONIDE 5 MG/G
CREAM TOPICAL 2 TIMES DAILY
Qty: 45 G | Refills: 1 | Status: SHIPPED | OUTPATIENT
Start: 2023-07-31

## 2023-07-31 RX ORDER — PREDNISONE 20 MG/1
20 TABLET ORAL 2 TIMES DAILY
Qty: 10 TABLET | Refills: 0 | Status: SHIPPED | OUTPATIENT
Start: 2023-07-31 | End: 2023-08-05

## 2023-07-31 ASSESSMENT — PAIN SCALES - GENERAL: PAINLEVEL: NO PAIN (0)

## 2023-07-31 NOTE — PROGRESS NOTES
Assessment & Plan     Contact dermatitis due to poison ivy  Reviewed.  Oral and topical steroid.  He is washing bedding, the dog, etc.  Followup with ongoing concerns.    - predniSONE (DELTASONE) 20 MG tablet; Take 1 tablet (20 mg) by mouth 2 times daily for 5 days  - triamcinolone (ARISTOCORT HP) 0.5 % external cream; Apply topically 2 times daily                 No follow-ups on file.    Stone Cook MD  [facility] [place]    Jorge Hudson is a 42 year old, presenting for the following health issues:  Derm Problem      HPI     Rash    Duration: Friday   Description  Location: all over   Itching: moderate  Intensity:  moderate  Accompanying signs and symptoms: rash, itchy  History (similar episodes/previous evaluation): None  Precipitating or alleviating factors:  poison ivy   New exposures:  none   Recent travel: no    Therapies tried and outcome: none           Review of Systems   Constitutional, HEENT, cardiovascular, pulmonary, gi and gu systems are negative, except as otherwise noted.      Objective    /76   Pulse 75   Temp 97.9  F (36.6  C) (Tympanic)   Wt 148.3 kg (327 lb)   SpO2 98%   BMI 45.61 kg/m    Body mass index is 45.61 kg/m .  Physical Exam   GENERAL: healthy, alert and no distress  SKIN: widespread areas of dermatitis in scattered areas over the whole body.    NEURO: Normal strength and tone, mentation intact and speech normal  PSYCH: mentation appears normal, affect normal/bright

## 2023-07-31 NOTE — TELEPHONE ENCOUNTER
11:00 AM    Reason for Call: OVERBOOK    Patient is having the following symptoms: poison ivy, would like rx.    The patient is requesting an appointment for today with Dr Cook.    Was an appointment offered for this call? No  If yes : Appointment type              Date    Preferred method for responding to this message: Telephone Call  What is your phone number ?[phone]     If we cannot reach you directly, may we leave a detailed response at the number you provided? Yes    Can this message wait until your PCP/provider returns, if unavailable today? Not applicable    Soheila Duckworth

## 2024-02-16 ENCOUNTER — TELEPHONE (OUTPATIENT)
Dept: FAMILY MEDICINE | Facility: OTHER | Age: 44
End: 2024-02-16

## 2024-02-16 NOTE — TELEPHONE ENCOUNTER
11:41 AM    Reason for Call: OVERBOOK    Patient is having the following symptoms: work comp/lt knee/doi 09/27/23/[employer]    The patient is requesting an appointment for feb/mar with Dr Cook.    Was an appointment offered for this call? No  If yes : Appointment type              Date    Preferred method for responding to this message: Telephone Call  What is your phone number ?[phone]     If we cannot reach you directly, may we leave a detailed response at the number you provided? Yes    Can this message wait until your PCP/provider returns, if unavailable today? Not applicable    Soheila Duckworth

## 2024-02-18 ENCOUNTER — HEALTH MAINTENANCE LETTER (OUTPATIENT)
Age: 44
End: 2024-02-18

## 2024-03-18 ENCOUNTER — ANCILLARY PROCEDURE (OUTPATIENT)
Dept: GENERAL RADIOLOGY | Facility: OTHER | Age: 44
End: 2024-03-18
Attending: FAMILY MEDICINE
Payer: COMMERCIAL

## 2024-03-18 ENCOUNTER — OFFICE VISIT (OUTPATIENT)
Dept: FAMILY MEDICINE | Facility: OTHER | Age: 44
End: 2024-03-18
Attending: FAMILY MEDICINE
Payer: COMMERCIAL

## 2024-03-18 ENCOUNTER — OFFICE VISIT (OUTPATIENT)
Dept: FAMILY MEDICINE | Facility: OTHER | Age: 44
End: 2024-03-18
Attending: FAMILY MEDICINE
Payer: OTHER MISCELLANEOUS

## 2024-03-18 ENCOUNTER — ANCILLARY PROCEDURE (OUTPATIENT)
Dept: GENERAL RADIOLOGY | Facility: OTHER | Age: 44
End: 2024-03-18
Attending: FAMILY MEDICINE
Payer: OTHER MISCELLANEOUS

## 2024-03-18 VITALS
SYSTOLIC BLOOD PRESSURE: 122 MMHG | HEART RATE: 67 BPM | TEMPERATURE: 97.3 F | OXYGEN SATURATION: 97 % | DIASTOLIC BLOOD PRESSURE: 74 MMHG | WEIGHT: 315 LBS | BODY MASS INDEX: 46.72 KG/M2

## 2024-03-18 VITALS
SYSTOLIC BLOOD PRESSURE: 122 MMHG | TEMPERATURE: 97.3 F | BODY MASS INDEX: 46.72 KG/M2 | DIASTOLIC BLOOD PRESSURE: 74 MMHG | OXYGEN SATURATION: 97 % | WEIGHT: 315 LBS | HEART RATE: 67 BPM

## 2024-03-18 DIAGNOSIS — S39.012A STRAIN OF LUMBAR REGION, INITIAL ENCOUNTER: Primary | ICD-10-CM

## 2024-03-18 DIAGNOSIS — M25.562 ACUTE PAIN OF LEFT KNEE: ICD-10-CM

## 2024-03-18 DIAGNOSIS — M25.562 ACUTE PAIN OF LEFT KNEE: Primary | ICD-10-CM

## 2024-03-18 DIAGNOSIS — S39.012A STRAIN OF LUMBAR REGION, INITIAL ENCOUNTER: ICD-10-CM

## 2024-03-18 PROCEDURE — 99213 OFFICE O/P EST LOW 20 MIN: CPT | Performed by: FAMILY MEDICINE

## 2024-03-18 PROCEDURE — 73562 X-RAY EXAM OF KNEE 3: CPT | Mod: TC | Performed by: RADIOLOGY

## 2024-03-18 PROCEDURE — 72100 X-RAY EXAM L-S SPINE 2/3 VWS: CPT | Mod: TC | Performed by: RADIOLOGY

## 2024-03-18 RX ORDER — CYCLOBENZAPRINE HCL 10 MG
10 TABLET ORAL 3 TIMES DAILY PRN
Qty: 40 TABLET | Refills: 1 | Status: SHIPPED | OUTPATIENT
Start: 2024-03-18

## 2024-03-18 ASSESSMENT — PAIN SCALES - GENERAL
PAINLEVEL: NO PAIN (0)
PAINLEVEL: NO PAIN (0)

## 2024-03-18 NOTE — PROGRESS NOTES
Occupational Visit     SUBJECTIVE:  Tristan Melgar, 43 year old, male is seen for new evaluation and treatment of occupational injury. Date of injury is 9/27/23.   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  Thought it was getting better but it never did and now worse.  Medial pain.      Linked Episodes   Type: Episode: Status: Noted: Resolved: Last update: Updated by:   WORK COMP [employer] Active 9/27/2023  3/18/2024  9:16 AM Bree Bautista LPN      Comments:       Musculoskeletal problem/pain    Duration: 3/27/23  Description  Location: left knee   Intensity:  moderate  Accompanying signs and symptoms: none  History  Previous similar problem: no   Previous evaluation:  none  Precipitating or alleviating factors:  Trauma or overuse: YES- work injury   Aggravating factors include: standing  Therapies tried and outcome: nothing      No Known Allergies      Review of Systems:  Constitutional, HEENT, cardiovascular, pulmonary, gi and gu systems are negative, except as otherwise noted.      OBJECTIVE:  Vitals:    03/18/24 0915   BP: 122/74   Pulse: 67   Temp: 97.3  F (36.3  C)   SpO2: 97%               Exam:  Walks with slight limp.  Area of pain is just medial to the patellar tendon on the left knee.        Labs: No results found for this or any previous visit (from the past 24 hour(s)).      ASSESSMENT/PLAN:  (M25.562) Acute pain of left knee  (primary encounter diagnosis)  Comment: ongoing.    Plan: XR Knee Left 3 Views (Clinic Performed), MR         Knee Left w/o Contrast        6 months of pain.  Suspect a medial meniscal vs. Other internal derangement.  Xray normal.  MRI pending and will follow/.

## 2024-03-18 NOTE — PROGRESS NOTES
Assessment & Plan     Strain of lumbar region, initial encounter  Recurrent.  Years of pain since injury over 10 years ago.  Xray shows old compression and some oa changes.  Flexeril at night.  Consider PT which he politely declined.  We talked about physical activity and weight loss, etc.  Follow.    - XR LUMBAR SPINE 2/3 VIEWS (Clinic Performed); Future  - cyclobenzaprine (FLEXERIL) 10 MG tablet; Take 1 tablet (10 mg) by mouth 3 times daily as needed for muscle spasms                  No follow-ups on file.    Subjective   Tristan is a 43 year old, presenting for the following health issues:  Back Pain    HPI     Musculoskeletal problem/pain    Duration: Friday   Description  Location: low back   Intensity:  moderate  Accompanying signs and symptoms: none  History  Previous similar problem: no   Previous evaluation:  none  Precipitating or alleviating factors:  Trauma or overuse: YES- bend over and felt a sharp pain   Aggravating factors include: bending   Therapies tried and outcome: heat           Review of Systems  Constitutional, HEENT, cardiovascular, pulmonary, gi and gu systems are negative, except as otherwise noted.      Objective    /74   Pulse 67   Temp 97.3  F (36.3  C) (Tympanic)   Wt (!) 152 kg (335 lb)   SpO2 97%   BMI 46.72 kg/m    Body mass index is 46.72 kg/m .  Physical Exam   GENERAL: alert and no distress  NECK: no adenopathy, no asymmetry, masses, or scars  RESP: lungs clear to auscultation - no rales, rhonchi or wheezes  CV: regular rate and rhythm, normal S1 S2, no S3 or S4, no murmur, click or rub, no peripheral edema  ABDOMEN: soft, nontender, no hepatosplenomegaly, no masses and bowel sounds normal  MS: obvious pain with rising from chair.              Signed Electronically by: Stone Cook MD

## 2024-04-02 ENCOUNTER — HOSPITAL ENCOUNTER (OUTPATIENT)
Dept: MRI IMAGING | Facility: HOSPITAL | Age: 44
Discharge: HOME | End: 2024-04-02
Attending: FAMILY MEDICINE | Admitting: FAMILY MEDICINE
Payer: OTHER MISCELLANEOUS

## 2024-04-02 ENCOUNTER — TELEPHONE (OUTPATIENT)
Dept: FAMILY MEDICINE | Facility: OTHER | Age: 44
End: 2024-04-02

## 2024-04-02 DIAGNOSIS — M25.562 ACUTE PAIN OF LEFT KNEE: ICD-10-CM

## 2024-04-02 PROCEDURE — 73721 MRI JNT OF LWR EXTRE W/O DYE: CPT | Mod: LT

## 2024-04-02 NOTE — TELEPHONE ENCOUNTER
Patient aware.  He is going to do patellar tendonitis exercises at home.  I offered PT and ortho if he would like but he will try the exercises and let us know if it's not improving.  Thanks.  Stone Cook MD

## 2024-04-02 NOTE — TELEPHONE ENCOUNTER
1:39 PM    Reason for Call: Phone Call    Description:  Song called you back. He will be at his phone the rest of the day.       Preferred method for responding to this message: Telephone Call  What is your phone number ?  [phone]     If we cannot reach you directly, may we leave a detailed response at the number you provided? Yes      Candace Xiong

## 2025-03-09 ENCOUNTER — HEALTH MAINTENANCE LETTER (OUTPATIENT)
Age: 45
End: 2025-03-09